# Patient Record
Sex: FEMALE | Race: OTHER | HISPANIC OR LATINO | URBAN - METROPOLITAN AREA
[De-identification: names, ages, dates, MRNs, and addresses within clinical notes are randomized per-mention and may not be internally consistent; named-entity substitution may affect disease eponyms.]

---

## 2022-05-08 ENCOUNTER — HOSPITAL ENCOUNTER (INPATIENT)
Facility: HOSPITAL | Age: 22
LOS: 10 days | Discharge: HOME | DRG: 885 | End: 2022-05-18
Attending: PSYCHIATRY & NEUROLOGY | Admitting: PSYCHIATRY & NEUROLOGY
Payer: MEDICAID

## 2022-05-08 DIAGNOSIS — F31.2 SEVERE MANIC BIPOLAR I DISORDER WITH PSYCHOTIC FEATURES: Primary | ICD-10-CM

## 2022-05-08 DIAGNOSIS — F29 PSYCHOSIS: ICD-10-CM

## 2022-05-08 PROCEDURE — 25000003 PHARM REV CODE 250: Performed by: PSYCHIATRY & NEUROLOGY

## 2022-05-08 PROCEDURE — 11400000 HC PSYCH PRIVATE ROOM

## 2022-05-08 PROCEDURE — 11000001 HC ACUTE MED/SURG PRIVATE ROOM

## 2022-05-08 RX ORDER — LORAZEPAM 2 MG/ML
2 INJECTION INTRAMUSCULAR EVERY 4 HOURS PRN
Status: DISCONTINUED | OUTPATIENT
Start: 2022-05-08 | End: 2022-05-18 | Stop reason: HOSPADM

## 2022-05-08 RX ORDER — ADHESIVE BANDAGE
30 BANDAGE TOPICAL DAILY PRN
Status: DISCONTINUED | OUTPATIENT
Start: 2022-05-08 | End: 2022-05-18 | Stop reason: HOSPADM

## 2022-05-08 RX ORDER — ACETAMINOPHEN 325 MG/1
650 TABLET ORAL EVERY 6 HOURS PRN
Status: DISCONTINUED | OUTPATIENT
Start: 2022-05-08 | End: 2022-05-18 | Stop reason: HOSPADM

## 2022-05-08 RX ORDER — NORETHINDRONE ACETATE AND ETHINYL ESTRADIOL, ETHINYL ESTRADIOL AND FERROUS FUMARATE 1MG-10(24)
1 KIT ORAL DAILY
COMMUNITY
Start: 2022-03-15

## 2022-05-08 RX ORDER — HYDROXYZINE HYDROCHLORIDE 50 MG/1
50 TABLET, FILM COATED ORAL EVERY 4 HOURS PRN
Status: DISCONTINUED | OUTPATIENT
Start: 2022-05-08 | End: 2022-05-18 | Stop reason: HOSPADM

## 2022-05-08 RX ORDER — DIPHENHYDRAMINE HCL 50 MG
50 CAPSULE ORAL EVERY 6 HOURS PRN
Status: DISCONTINUED | OUTPATIENT
Start: 2022-05-08 | End: 2022-05-18 | Stop reason: HOSPADM

## 2022-05-08 RX ORDER — HYDROXYZINE PAMOATE 25 MG/1
25 CAPSULE ORAL DAILY PRN
Status: ON HOLD | COMMUNITY
Start: 2022-04-18 | End: 2022-05-09

## 2022-05-08 RX ORDER — ISOTRETINOIN 20 MG/1
20 CAPSULE ORAL 2 TIMES DAILY
Status: ON HOLD | COMMUNITY
Start: 2022-04-14 | End: 2022-05-11

## 2022-05-08 RX ORDER — DIPHENHYDRAMINE HYDROCHLORIDE 50 MG/ML
50 INJECTION INTRAMUSCULAR; INTRAVENOUS EVERY 4 HOURS PRN
Status: DISCONTINUED | OUTPATIENT
Start: 2022-05-08 | End: 2022-05-18 | Stop reason: HOSPADM

## 2022-05-08 RX ORDER — PANTOPRAZOLE SODIUM 40 MG/1
40 TABLET, DELAYED RELEASE ORAL DAILY
COMMUNITY
Start: 2022-04-18

## 2022-05-08 RX ORDER — IBUPROFEN 200 MG
1 TABLET ORAL DAILY
Status: DISCONTINUED | OUTPATIENT
Start: 2022-05-09 | End: 2022-05-15

## 2022-05-08 RX ORDER — HALOPERIDOL 5 MG/ML
10 INJECTION INTRAMUSCULAR EVERY 4 HOURS PRN
Status: DISCONTINUED | OUTPATIENT
Start: 2022-05-08 | End: 2022-05-18 | Stop reason: HOSPADM

## 2022-05-08 RX ORDER — AZITHROMYCIN 250 MG/1
250 TABLET, FILM COATED ORAL
Status: ON HOLD | COMMUNITY
Start: 2022-05-05 | End: 2022-05-09

## 2022-05-08 RX ORDER — ALUMINUM HYDROXIDE, MAGNESIUM HYDROXIDE, AND SIMETHICONE 2400; 240; 2400 MG/30ML; MG/30ML; MG/30ML
30 SUSPENSION ORAL EVERY 6 HOURS PRN
Status: DISCONTINUED | OUTPATIENT
Start: 2022-05-08 | End: 2022-05-18 | Stop reason: HOSPADM

## 2022-05-08 RX ORDER — TRAZODONE HYDROCHLORIDE 100 MG/1
100 TABLET ORAL NIGHTLY PRN
Status: DISCONTINUED | OUTPATIENT
Start: 2022-05-08 | End: 2022-05-18 | Stop reason: HOSPADM

## 2022-05-08 RX ORDER — ISOTRETINOIN 30 MG/1
30 CAPSULE ORAL
Status: ON HOLD | COMMUNITY
End: 2022-05-09

## 2022-05-08 RX ORDER — SPIRONOLACTONE 25 MG/1
50 TABLET ORAL 2 TIMES DAILY
COMMUNITY
Start: 2022-03-13

## 2022-05-08 RX ADMIN — ACETAMINOPHEN 650 MG: 325 TABLET, FILM COATED ORAL at 10:05

## 2022-05-08 RX ADMIN — TRAZODONE HYDROCHLORIDE 100 MG: 100 TABLET ORAL at 10:05

## 2022-05-09 PROBLEM — F31.2 SEVERE MANIC BIPOLAR I DISORDER WITH PSYCHOTIC FEATURES: Status: ACTIVE | Noted: 2022-05-09

## 2022-05-09 LAB
ALBUMIN SERPL-MCNC: 4.4 GM/DL (ref 3.5–5)
ALBUMIN/GLOB SERPL: 1.4 RATIO (ref 1.1–2)
ALP SERPL-CCNC: 68 UNIT/L (ref 40–150)
ALT SERPL-CCNC: 22 UNIT/L (ref 0–55)
AST SERPL-CCNC: 22 UNIT/L (ref 5–34)
BASOPHILS # BLD AUTO: 0.02 X10(3)/MCL (ref 0–0.2)
BASOPHILS NFR BLD AUTO: 0.3 %
BILIRUBIN DIRECT+TOT PNL SERPL-MCNC: 0.3 MG/DL (ref 0–0.5)
BILIRUBIN DIRECT+TOT PNL SERPL-MCNC: 0.5 MG/DL (ref 0–0.8)
BILIRUBIN DIRECT+TOT PNL SERPL-MCNC: 0.8 MG/DL
BUN SERPL-MCNC: 10.7 MG/DL (ref 7–18.7)
CALCIUM SERPL-MCNC: 10.3 MG/DL (ref 8.4–10.2)
CHLORIDE SERPL-SCNC: 101 MMOL/L (ref 98–107)
CHOLEST SERPL-MCNC: 182 MG/DL
CHOLEST/HDLC SERPL: 3 {RATIO} (ref 0–5)
CO2 SERPL-SCNC: 27 MMOL/L (ref 22–29)
CREAT SERPL-MCNC: 0.74 MG/DL (ref 0.55–1.02)
EOSINOPHIL # BLD AUTO: 0.13 X10(3)/MCL (ref 0–0.9)
EOSINOPHIL NFR BLD AUTO: 1.9 %
ERYTHROCYTE [DISTWIDTH] IN BLOOD BY AUTOMATED COUNT: 11.9 % (ref 11.5–17)
GLOBULIN SER-MCNC: 3.1 GM/DL (ref 2.4–3.5)
GLUCOSE SERPL-MCNC: 88 MG/DL (ref 74–100)
HCT VFR BLD AUTO: 42.4 % (ref 37–47)
HDLC SERPL-MCNC: 66 MG/DL (ref 35–60)
HGB BLD-MCNC: 14.5 GM/DL (ref 12–16)
IMM GRANULOCYTES # BLD AUTO: 0.03 X10(3)/MCL (ref 0–0.02)
IMM GRANULOCYTES NFR BLD AUTO: 0.4 % (ref 0–0.43)
LDLC SERPL CALC-MCNC: 105 MG/DL (ref 50–140)
LYMPHOCYTES # BLD AUTO: 1.81 X10(3)/MCL (ref 0.6–4.6)
LYMPHOCYTES NFR BLD AUTO: 26.7 %
MCH RBC QN AUTO: 30.8 PG (ref 27–31)
MCHC RBC AUTO-ENTMCNC: 34.2 MG/DL (ref 33–36)
MCV RBC AUTO: 90 FL (ref 80–94)
MONOCYTES # BLD AUTO: 0.61 X10(3)/MCL (ref 0.1–1.3)
MONOCYTES NFR BLD AUTO: 9 %
NEUTROPHILS # BLD AUTO: 4.2 X10(3)/MCL (ref 2.1–9.2)
NEUTROPHILS NFR BLD AUTO: 61.7 %
NRBC BLD AUTO-RTO: 0 %
PLATELET # BLD AUTO: 304 X10(3)/MCL (ref 130–400)
PMV BLD AUTO: 9.7 FL (ref 9.4–12.4)
POTASSIUM SERPL-SCNC: 4.1 MMOL/L (ref 3.5–5.1)
PROT SERPL-MCNC: 7.5 GM/DL (ref 6.4–8.3)
RBC # BLD AUTO: 4.71 X10(6)/MCL (ref 4.2–5.4)
SODIUM SERPL-SCNC: 140 MMOL/L (ref 136–145)
T PALLIDUM AB SER QL: NONREACTIVE
T PALLIDUM AB SER QL: NORMAL
TRIGL SERPL-MCNC: 55 MG/DL (ref 37–140)
TSH SERPL-ACNC: 1.16 UIU/ML (ref 0.35–4.94)
VLDLC SERPL CALC-MCNC: 11 MG/DL
WBC # SPEC AUTO: 6.8 X10(3)/MCL (ref 4.5–11.5)

## 2022-05-09 PROCEDURE — 80061 LIPID PANEL: CPT | Performed by: PSYCHIATRY & NEUROLOGY

## 2022-05-09 PROCEDURE — 25000003 PHARM REV CODE 250: Performed by: PEDIATRICS

## 2022-05-09 PROCEDURE — 86780 TREPONEMA PALLIDUM: CPT | Performed by: PSYCHIATRY & NEUROLOGY

## 2022-05-09 PROCEDURE — 25000003 PHARM REV CODE 250: Performed by: PSYCHIATRY & NEUROLOGY

## 2022-05-09 PROCEDURE — 84443 ASSAY THYROID STIM HORMONE: CPT | Performed by: PSYCHIATRY & NEUROLOGY

## 2022-05-09 PROCEDURE — S4991 NICOTINE PATCH NONLEGEND: HCPCS | Performed by: PSYCHIATRY & NEUROLOGY

## 2022-05-09 PROCEDURE — 85025 COMPLETE CBC W/AUTO DIFF WBC: CPT | Performed by: PSYCHIATRY & NEUROLOGY

## 2022-05-09 PROCEDURE — 11400000 HC PSYCH PRIVATE ROOM

## 2022-05-09 PROCEDURE — 36415 COLL VENOUS BLD VENIPUNCTURE: CPT | Performed by: PSYCHIATRY & NEUROLOGY

## 2022-05-09 PROCEDURE — 11000001 HC ACUTE MED/SURG PRIVATE ROOM

## 2022-05-09 PROCEDURE — 80053 COMPREHEN METABOLIC PANEL: CPT | Performed by: PSYCHIATRY & NEUROLOGY

## 2022-05-09 RX ORDER — SPIRONOLACTONE 25 MG/1
50 TABLET ORAL 2 TIMES DAILY
Status: DISCONTINUED | OUTPATIENT
Start: 2022-05-09 | End: 2022-05-18 | Stop reason: HOSPADM

## 2022-05-09 RX ORDER — ARIPIPRAZOLE 5 MG/1
5 TABLET ORAL DAILY
Status: DISCONTINUED | OUTPATIENT
Start: 2022-05-09 | End: 2022-05-11

## 2022-05-09 RX ORDER — PANTOPRAZOLE SODIUM 40 MG/1
40 TABLET, DELAYED RELEASE ORAL DAILY
Status: DISCONTINUED | OUTPATIENT
Start: 2022-05-09 | End: 2022-05-18 | Stop reason: HOSPADM

## 2022-05-09 RX ADMIN — NICOTINE 1 PATCH: 21 PATCH, EXTENDED RELEASE TRANSDERMAL at 08:05

## 2022-05-09 RX ADMIN — HYDROXYZINE HYDROCHLORIDE 50 MG: 50 TABLET, FILM COATED ORAL at 04:05

## 2022-05-09 RX ADMIN — TRAZODONE HYDROCHLORIDE 100 MG: 100 TABLET ORAL at 08:05

## 2022-05-09 RX ADMIN — PANTOPRAZOLE SODIUM 40 MG: 40 TABLET, DELAYED RELEASE ORAL at 08:05

## 2022-05-09 RX ADMIN — ARIPIPRAZOLE 5 MG: 5 TABLET ORAL at 11:05

## 2022-05-09 RX ADMIN — SPIRONOLACTONE 50 MG: 25 TABLET ORAL at 08:05

## 2022-05-09 NOTE — PROGRESS NOTES
"UDS (-) ETOH  <10  Pt is 22yo hf admitted on PEC for AVH, bizarre behavior, and hyperreligiosity. Pt was found wandering in a field. "i stopped taking my birth control because it was baskically killing my babies. I know that God is doing something special. pt is single with no dependents and is currently living at home with her parents. 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 Pt states that she vapes pretty smells and has vaped synthetic marijuana so she could hear the voices of angels. Pt states that she is judged by her color, ethnicity and race and that she accidnetly committed suicide when she cuts herself shaving. "Sponge katherine is here to save us" During assessment, pt is obviously responding to internal stimuli. At one point she gets on her knees and begins to pray and talk to her shoes. pt will retrun home with her mother at time of discharge.      05/09/22 0900   Initial Information   Source of Information patient   Reason for Admission AVH, bizarre bx   Limitations on Visitors/Phone Calls none   Temporary Family Living Arrangements (While Hospitalized) none needed   Current or Previous  Service none   Legal Status    Type of Admission Involuntary   Type of Involuntary Admission PEC   Spiritual Beliefs   Spiritual, Cultural Beliefs, Advent Practices, Values that Affect Care yes   Description of Beliefs that Will Affect Care [facility]    Contact Status no, requests staff contact   Relationship/Environment   Primary Source of Support/Comfort parent   Name of Support/Comfort Primary Source Leah Cabrales   Lives With parent(s)   Primary Roles/Responsibilities student, full time   Resource/Environmental Concerns   Current Living Arrangements home/apartment/condo   Substance Use/Withdrawal   Substance Use Current, used prior to admission  (synthetic marijuana)   Abuse Screen (yes response referral indicated)   Feels Unsafe at Home or Work/School no   Feels Threatened by " Someone no   Does Anyone Try to Keep You From Having Contact with Others or Doing Things Outside Your Home? no   Physical Signs of Abuse Present no   Abuse Details   Physical Abuse No   Sexual Abuse No   Emotional Abuse No   If applicable, has the abuse been reported? No   If abuse has not been reported, is a report indicated? No   Suicidal Ideation   1. Wish to be Dead (Lifetime) No   1. Wish to be Dead (Past Month) No   1. Wish to be Dead (Now) No   2. Non-Specific Active Suicidal Thoughts (Lifetime) No   2. Non-Specific Active Suicidal Thoughts (Past Month) No   2. Non-Specific Active Suicidal Thoughts (Now) No   3. Active Suicidal Ideation with any Methods (Not Plan) Without Intent to Act (Lifetime) No   3. Active Sucidal Ideation with any Methods (Not Plan) Without Intent to Act (Past Month) No   3. Active Sucidal Ideation with any Methods (Not Plan) Without Intent to Act (Now) No   4. Active Suicidal Ideation with Some Intent to Act, Without Specific Plan (Lifetime) No   4. Active Suicidal Ideation with Some Intent to Act, Without Specific Plan (Past Month) No   4. Active Suicidal Ideation with Some Intent to Act, Without Specific Plan (Now) No   5. Active Suicidal Ideation with Specific Plan and Intent (Lifetime) No   5. Active Suicidal Ideation with Specific Plan and Intent (Past Month) No   5. Active Suicidal Ideation with Specific Plan and Intent (Now) No   Suicide Risk No Risk   Violence Risk   Feels Like Hurting Others no   Previous Attempt to Harm Others no   AUDIT-C (Alcohol Use Disorders ID Test)   Alcohol Use In Past Year 0-->never   Alcohol Amount Per Day In Past Year 0-->one or two   More Than 6 Drinks On One Occasion 0-->never   Total AUDIT-C Score 0   Elopement Screen   Have You Ever Attempted to Leave a Facility Without Permission or Authorization? no   Any History of Attempted Escape or Elopement During a Previous Hospitalization? no   Depression Screen (Over the Past Two Weeks)   Have You Felt  Down, Depressed or Hopeless? yes   Have You Felt Little Interest or Pleasure in Doing Things? yes   Traumatic Event Screening   Have You Ever Experienced a Traumatic Event? no   Have You Ever Witnessed a Violent Act? no   Have You Ever Experienced a Life Threatening Injury or Near Death Encounter? no   Transition Planning   Patient/Family Anticipates Transition to home;home with family   Transportation Anticipated family or friend will provide

## 2022-05-09 NOTE — H&P
[facility]  History & Physical    Subjective:      Chief Complaint/Reason for Admission: hallucinations     Gris Cabrales is a 21 y.o. female. She presents with hallucinations and psychosis. Was found wandering in a field     Past Medical History:   Diagnosis Date    Anxiety disorder, unspecified     Depression      No past surgical history on file.  No family history on file.       PTA Medications   Medication Sig    ISOtretinoin (ACCUTANE) 20 MG capsule Take 20 mg by mouth 2 (two) times daily.    LO LOESTRIN FE 1 mg-10 mcg (24)/10 mcg (2) Tab Take 1 tablet by mouth once daily.    pantoprazole (PROTONIX) 40 MG tablet Take 40 mg by mouth once daily.    spironolactone (ALDACTONE) 25 MG tablet Take 50 mg by mouth 2 (two) times daily.    azithromycin (Z-IFRAH) 250 MG tablet Take 250 mg by mouth.    hydrOXYzine pamoate (VISTARIL) 25 MG Cap Take 25 mg by mouth daily as needed.    ISOtretinoin (ACCUTANE) 30 MG capsule Take 30 mg by mouth.     Review of patient's allergies indicates:   Allergen Reactions    Penicillins Hives        Review of Systems   Constitutional: Negative.    HENT: Negative.    Eyes: Negative.    Respiratory: Negative.    Cardiovascular: Negative.    Gastrointestinal: Negative.    Genitourinary: Negative.    Musculoskeletal: Negative.    Skin: Negative.    Neurological: Negative.    Endo/Heme/Allergies: Negative.    Psychiatric/Behavioral: Positive for hallucinations and suicidal ideas. Negative for depression and substance abuse.       Objective:      Vital Signs (Most Recent)       Vital Signs Range (Last 24H):  Temp:  [98.1 °F (36.7 °C)-98.4 °F (36.9 °C)]   Pulse:  [78-93]   Resp:  [16-18]   BP: (130-133)/(74-86)   SpO2:  [98 %-99 %]     Physical Exam  HENT:      Head: Normocephalic.      Right Ear: Tympanic membrane normal.      Left Ear: Tympanic membrane normal.      Nose: Nose normal.      Mouth/Throat:      Mouth: Mucous membranes are moist.    Eyes:      Extraocular Movements: Extraocular movements intact.      Pupils: Pupils are equal, round, and reactive to light.   Cardiovascular:      Rate and Rhythm: Normal rate and regular rhythm.   Pulmonary:      Effort: Pulmonary effort is normal.   Abdominal:      General: Abdomen is flat.   Musculoskeletal:         General: Normal range of motion.   Skin:     General: Skin is warm.   Neurological:      General: No focal deficit present.      Mental Status: She is alert and oriented to person, place, and time.      Comments: Vision normal   Hearing normal   EOM intact   Face muscles normal  Facial sensation normal   Shrugs shoulders  Tongue midline            Data Review:    Recent Results (from the past 48 hour(s))   Urinalysis, Reflex to Urine Culture Urine, Clean Catch    Collection Time: 05/08/22 12:40 PM    Specimen: Urine   Result Value Ref Range    Color, UA Yellow Yellow, Colorless, Other, Clear    Appearance, UA Clear Clear    Specific Gravity, UA 1.006 1.001 - 1.030    pH, UA 7.0 5.0, 5.5, 6.0, 6.5, 7.0, 7.5, 8.0, 8.5    Protein, UA Negative Negative mg/dL    Glucose, UA Negative Negative, Normal mg/dL    Ketones, UA Negative Negative, +1, +2, +3, +4, +5 mg/dL    Blood, UA Negative Negative unit/L    Bilirubin, UA Negative Negative mg/dL    Urobilinogen, UA 0.2 0.2, 1.0, Normal mg/dL    Nitrites, UA Negative Negative    Leukocyte Esterase, UA Negative Negative, 75  unit/L   Pregnancy, urine rapid    Collection Time: 05/08/22 12:40 PM   Result Value Ref Range    Beta hCG Qualitative, Urine Negative Negative   Drug screen panel, emergency    Collection Time: 05/08/22 12:40 PM   Result Value Ref Range    Amphetamines, Urine Negative Negative    Barbituates, Urine Negative Negative    Benzodiazepine, Urine Negative Negative    Cannabinoids, Urine Negative Negative    Cocaine, Urine Negative Negative    Fentanyl, Urine Negative Negative    Mdma, Urine Negative Negative    Opiates, Urine Negative Negative     Phencyclidine, Urine Negative Negative    pH, Urine 7.0 3.0 - 11.0    Specific Gravity, Urine Auto 1.006 1.001 - 1.035   Urinalysis, Microscopic    Collection Time: 05/08/22 12:40 PM   Result Value Ref Range    RBC, UA None Seen None Seen, 0-2, 3-5, 0-5 /HPF    WBC, UA None Seen None Seen, 0-2, 3-5, 0-5 /HPF    Squamous Epithelial Cells, UA None Seen 0-4, None Seen /LPF    Bacteria, UA None Seen None Seen, Rare, Occasional /HPF   CBC with Differential    Collection Time: 05/08/22  1:31 PM   Result Value Ref Range    WBC 10.2 4.5 - 11.5 x10(3)/mcL    RBC 4.53 4.20 - 5.40 x10(6)/mcL    Hgb 14.0 12.0 - 16.0 gm/dL    Hct 39.9 37.0 - 47.0 %    MCV 88.1 80.0 - 94.0 fL    MCH 30.9 27.0 - 31.0 pg    MCHC 35.1 33.0 - 36.0 mg/dL    RDW 11.9 11.5 - 17.0 %    Platelet 291 130 - 400 x10(3)/mcL    MPV 9.4 9.4 - 12.4 fL    Neutro Auto 69.1 %    Lymph Auto 21.3 %    Mono Auto 8.3 %    Eos Auto 0.8 %    Basophil Auto 0.2 %    Abs Lymph 2.16 0.6 - 4.6 x10(3)/mcL    Abs Neutro 7.0 2.1 - 9.2 x10(3)/mcL    Abs Mono 0.84 0.1 - 1.3 x10(3)/mcL    Abs Eos 0.08 0 - 0.9 x10(3)/mcL    Abs Baso 0.02 0 - 0.2 x10(3)/mcL    IG# 0.03 (H) 0 - 0.0155 x10(3)/mcL    IG% 0.3 0 - 0.43 %    NRBC% 0.0 %   Comprehensive Metabolic Panel    Collection Time: 05/08/22  1:31 PM   Result Value Ref Range    Sodium Level 136 136 - 145 mmol/L    Potassium Level 3.9 3.5 - 5.1 mmol/L    Chloride 104 98 - 107 mmol/L    Carbon Dioxide 23 22 - 29 mmol/L    Glucose Level 98 74 - 100 mg/dL    Blood Urea Nitrogen 9.6 7.0 - 18.7 mg/dL    Creatinine 0.72 0.55 - 1.02 mg/dL    Calcium Level Total 9.8 8.4 - 10.2 mg/dL    Protein Total 7.4 6.4 - 8.3 gm/dL    Albumin Level 4.4 3.5 - 5.0 gm/dL    Globulin 3.0 2.4 - 3.5 gm/dL    Albumin/Globulin Ratio 1.5 1.1 - 2.0 ratio    Bilirubin Total 0.7 <=1.5 mg/dL    Bilirubin Direct 0.3 0.0 - 0.5 mg/dL    Bilirubin Indirect 0.40 0.00 - 0.80 mg/dL    Alkaline Phosphatase 66 40 - 150 unit/L    Alanine Aminotransferase 22 0 - 55 unit/L     Aspartate Aminotransferase 22 5 - 34 unit/L    Estimated GFR-Non  >60 mls/min/1.73/m2   Lipase    Collection Time: 05/08/22  1:31 PM   Result Value Ref Range    Lipase Level 21 <=60 U/L   Ethanol    Collection Time: 05/08/22  1:31 PM   Result Value Ref Range    Ethanol Level <10.0 <=10.0 mg/dL   TSH    Collection Time: 05/08/22  1:31 PM   Result Value Ref Range    Thyroid Stimulating Hormone 0.7846 0.3500 - 4.9400 uIU/mL        No results found.       Assessment and Plan       Psychosis/ schizophrenia with hallucinations

## 2022-05-09 NOTE — PLAN OF CARE
Problem: Adult Inpatient Plan of Care  Goal: Plan of Care Review  Outcome: Ongoing, Progressing  Goal: Patient-Specific Goal (Individualized)  Outcome: Ongoing, Progressing  Goal: Absence of Hospital-Acquired Illness or Injury  Outcome: Ongoing, Progressing  Goal: Optimal Comfort and Wellbeing  Outcome: Ongoing, Progressing  Goal: Readiness for Transition of Care  Outcome: Ongoing, Progressing     Problem: Behavior Regulation Impairment (Psychotic Signs/Symptoms)  Goal: Improved Behavioral Control (Psychotic Signs/Symptoms)  Outcome: Ongoing, Progressing     Problem: Cognitive Impairment (Psychotic Signs/Symptoms)  Goal: Optimal Cognitive Function (Psychotic Signs/Symptoms)  Outcome: Ongoing, Progressing     Problem: Psychomotor Impairment (Psychotic Signs/Symptoms)  Goal: Improved Psychomotor Symptoms (Psychotic Signs/Symptoms)  Outcome: Ongoing, Progressing     Problem: Sensory Perception Impairment (Psychotic Signs/Symptoms)  Goal: Decreased Sensory Symptoms (Psychotic Signs/Symptoms)  Outcome: Ongoing, Progressing     Problem: Sleep Disturbance (Psychotic Signs/Symptoms)  Goal: Improved Sleep (Psychotic Signs/Symptoms)  Outcome: Ongoing, Progressing

## 2022-05-09 NOTE — PROGRESS NOTES
05/09/22 1400   [facility] Group Therapy   Group Name Therapeutic Recreation   Specific Interventions Skilled Activity Creative Expression   Participation Level Active   Participation Quality Social   Insight/Motivation Limited   Affect/Mood Display Bizarre;Bright;Elevated;Impulsive   Cognition Disoriented;Confused   Psychomotor WNL   Gris attended TR group, was cooperative but bizarre, appearing manic with +RIS AEB hyper verbal with FOI & CARLEE.

## 2022-05-09 NOTE — H&P
"5/9/2022 9:11 AM   Gris Cabrales   2000   [id number]            Psychiatry Inpatient Admission Note    Date of Admission: 5/8/2022  6:42 PM    Current Legal Status: Physician's Emergency Certificate (PEC)    Chief Complaint: Anxiety, Depression, Confusion    SUBJECTIVE:   History of Present Illness:   Gris Cabrales is a 21 y.o. female placed under a PEC at [facility] due to anxiety, depression, and her mother's concerns about her confusion.  States, "I hate how people Spiritism you after but they don't realize the blessings they have.  God created music."  She states that she's "Worked so hard for everything that she has."  She states that she has not smoked cigarettes since 2017 but has a nicotine patch on.  When asked why they put it on her she states, "To make me laugh".  Difficult to get linear answers from her.  Very tangential.  Grandiose.  Odd.  (+)Auditory and visual hallucinations.  Will admit to [facility] for medication management and behavior monitoring.        Past Psychiatric History:   Previous Psychiatric Hospitalizations: Denies prior psychiatric treatment   Previous Medication Trials: Denies  Previous Suicide Attempts: States her mother "stresses her" sometimes and sometimes she wants to cut herself  Outpatient psychiatrist: None    Past Medical/Surgical History:   Past Medical History:   Diagnosis Date    Anxiety disorder, unspecified     Depression      No past surgical history on file.      Family Psychiatric History:   None known     Allergies:   Review of patient's allergies indicates:   Allergen Reactions    Penicillins Hives       Substance Abuse History:   Tobacco: Denies  Alcohol: Drinks Twisted Tea at times  Illicit Substances: States "all that" but UDS was negative  Treatment: N/a      Current Medications:   Home Psychiatric Meds: Ramírez    Scheduled Meds:    nicotine  1 patch Transdermal Daily    norethindrone-e.estradioL-iron  1 tablet Oral Daily    pantoprazole  40 mg Oral Daily    " spironolactone  50 mg Oral BID      PRN Meds: acetaminophen, aluminum & magnesium hydroxide-simethicone, diphenhydrAMINE, diphenhydrAMINE, haloperidol lactate, hydrOXYzine HCL, lorazepam, magnesium hydroxide 400 mg/5 ml, trazodone   Psychotherapeutics (From admission, onward)            Start     Stop Route Frequency Ordered    05/08/22 1937  haloperidol lactate injection 10 mg         -- IM Every 4 hours PRN 05/08/22 1842 05/08/22 1937  lorazepam injection 2 mg         -- IM Every 4 hours PRN 05/08/22 1842 05/08/22 1937  traZODone tablet 100 mg         -- Oral Nightly PRN 05/08/22 1842            Social History:  Housing Status: Lives in [place]  Relationship Status/Sexual Orientation: Single   Children: 0  Education: States that she is in [employer]   Employment Status/Info: States that she works at [employer]         OBJECTIVE:   Medical Review Of Systems:  Constitutional: negative  Respiratory: negative  Cardiovascular: negative  Gastrointestinal: negative  Genitourinary:negative  Musculoskeletal:negative  Neurological: negative    Vitals   There were no vitals filed for this visit.     Labs/Imaging/Studies:   Recent Results (from the past 48 hour(s))   Urinalysis, Reflex to Urine Culture Urine, Clean Catch    Collection Time: 05/08/22 12:40 PM    Specimen: Urine   Result Value Ref Range    Color, UA Yellow Yellow, Colorless, Other, Clear    Appearance, UA Clear Clear    Specific Gravity, UA 1.006 1.001 - 1.030    pH, UA 7.0 5.0, 5.5, 6.0, 6.5, 7.0, 7.5, 8.0, 8.5    Protein, UA Negative Negative mg/dL    Glucose, UA Negative Negative, Normal mg/dL    Ketones, UA Negative Negative, +1, +2, +3, +4, +5 mg/dL    Blood, UA Negative Negative unit/L    Bilirubin, UA Negative Negative mg/dL    Urobilinogen, UA 0.2 0.2, 1.0, Normal mg/dL    Nitrites, UA Negative Negative    Leukocyte Esterase, UA Negative Negative, 75  unit/L   Pregnancy, urine rapid    Collection Time: 05/08/22 12:40 PM   Result Value  Ref Range    Beta hCG Qualitative, Urine Negative Negative   Drug screen panel, emergency    Collection Time: 05/08/22 12:40 PM   Result Value Ref Range    Amphetamines, Urine Negative Negative    Barbituates, Urine Negative Negative    Benzodiazepine, Urine Negative Negative    Cannabinoids, Urine Negative Negative    Cocaine, Urine Negative Negative    Fentanyl, Urine Negative Negative    Mdma, Urine Negative Negative    Opiates, Urine Negative Negative    Phencyclidine, Urine Negative Negative    pH, Urine 7.0 3.0 - 11.0    Specific Gravity, Urine Auto 1.006 1.001 - 1.035   Urinalysis, Microscopic    Collection Time: 05/08/22 12:40 PM   Result Value Ref Range    RBC, UA None Seen None Seen, 0-2, 3-5, 0-5 /HPF    WBC, UA None Seen None Seen, 0-2, 3-5, 0-5 /HPF    Squamous Epithelial Cells, UA None Seen 0-4, None Seen /LPF    Bacteria, UA None Seen None Seen, Rare, Occasional /HPF   CBC with Differential    Collection Time: 05/08/22  1:31 PM   Result Value Ref Range    WBC 10.2 4.5 - 11.5 x10(3)/mcL    RBC 4.53 4.20 - 5.40 x10(6)/mcL    Hgb 14.0 12.0 - 16.0 gm/dL    Hct 39.9 37.0 - 47.0 %    MCV 88.1 80.0 - 94.0 fL    MCH 30.9 27.0 - 31.0 pg    MCHC 35.1 33.0 - 36.0 mg/dL    RDW 11.9 11.5 - 17.0 %    Platelet 291 130 - 400 x10(3)/mcL    MPV 9.4 9.4 - 12.4 fL    Neutro Auto 69.1 %    Lymph Auto 21.3 %    Mono Auto 8.3 %    Eos Auto 0.8 %    Basophil Auto 0.2 %    Abs Lymph 2.16 0.6 - 4.6 x10(3)/mcL    Abs Neutro 7.0 2.1 - 9.2 x10(3)/mcL    Abs Mono 0.84 0.1 - 1.3 x10(3)/mcL    Abs Eos 0.08 0 - 0.9 x10(3)/mcL    Abs Baso 0.02 0 - 0.2 x10(3)/mcL    IG# 0.03 (H) 0 - 0.0155 x10(3)/mcL    IG% 0.3 0 - 0.43 %    NRBC% 0.0 %   Comprehensive Metabolic Panel    Collection Time: 05/08/22  1:31 PM   Result Value Ref Range    Sodium Level 136 136 - 145 mmol/L    Potassium Level 3.9 3.5 - 5.1 mmol/L    Chloride 104 98 - 107 mmol/L    Carbon Dioxide 23 22 - 29 mmol/L    Glucose Level 98 74 - 100 mg/dL    Blood Urea Nitrogen 9.6  7.0 - 18.7 mg/dL    Creatinine 0.72 0.55 - 1.02 mg/dL    Calcium Level Total 9.8 8.4 - 10.2 mg/dL    Protein Total 7.4 6.4 - 8.3 gm/dL    Albumin Level 4.4 3.5 - 5.0 gm/dL    Globulin 3.0 2.4 - 3.5 gm/dL    Albumin/Globulin Ratio 1.5 1.1 - 2.0 ratio    Bilirubin Total 0.7 <=1.5 mg/dL    Bilirubin Direct 0.3 0.0 - 0.5 mg/dL    Bilirubin Indirect 0.40 0.00 - 0.80 mg/dL    Alkaline Phosphatase 66 40 - 150 unit/L    Alanine Aminotransferase 22 0 - 55 unit/L    Aspartate Aminotransferase 22 5 - 34 unit/L    Estimated GFR-Non  >60 mls/min/1.73/m2   Lipase    Collection Time: 05/08/22  1:31 PM   Result Value Ref Range    Lipase Level 21 <=60 U/L   Ethanol    Collection Time: 05/08/22  1:31 PM   Result Value Ref Range    Ethanol Level <10.0 <=10.0 mg/dL   TSH    Collection Time: 05/08/22  1:31 PM   Result Value Ref Range    Thyroid Stimulating Hormone 0.7846 0.3500 - 4.9400 uIU/mL   CBC with Differential    Collection Time: 05/09/22  7:40 AM   Result Value Ref Range    WBC 6.8 4.5 - 11.5 x10(3)/mcL    RBC 4.71 4.20 - 5.40 x10(6)/mcL    Hgb 14.5 12.0 - 16.0 gm/dL    Hct 42.4 37.0 - 47.0 %    MCV 90.0 80.0 - 94.0 fL    MCH 30.8 27.0 - 31.0 pg    MCHC 34.2 33.0 - 36.0 mg/dL    RDW 11.9 11.5 - 17.0 %    Platelet 304 130 - 400 x10(3)/mcL    MPV 9.7 9.4 - 12.4 fL    Neutro Auto 61.7 %    Lymph Auto 26.7 %    Mono Auto 9.0 %    Eos Auto 1.9 %    Basophil Auto 0.3 %    Abs Lymph 1.81 0.6 - 4.6 x10(3)/mcL    Abs Neutro 4.2 2.1 - 9.2 x10(3)/mcL    Abs Mono 0.61 0.1 - 1.3 x10(3)/mcL    Abs Eos 0.13 0 - 0.9 x10(3)/mcL    Abs Baso 0.02 0 - 0.2 x10(3)/mcL    IG# 0.03 (H) 0 - 0.0155 x10(3)/mcL    IG% 0.4 0 - 0.43 %    NRBC% 0.0 %   Lipid panel    Collection Time: 05/09/22  7:40 AM   Result Value Ref Range    Cholesterol Total 182 <=200 mg/dL    HDL Cholesterol 66 (H) 35 - 60 mg/dL    Triglyceride 55 37 - 140 mg/dL    Cholesterol/HDL Ratio 3 0 - 5    Very Low Density Lipoprotein 11     LDL Cholesterol 105.00 50.00 - 140.00  mg/dL   Comprehensive metabolic panel    Collection Time: 05/09/22  7:40 AM   Result Value Ref Range    Sodium Level 140 136 - 145 mmol/L    Potassium Level 4.1 3.5 - 5.1 mmol/L    Chloride 101 98 - 107 mmol/L    Carbon Dioxide 27 22 - 29 mmol/L    Glucose Level 88 74 - 100 mg/dL    Blood Urea Nitrogen 10.7 7.0 - 18.7 mg/dL    Creatinine 0.74 0.55 - 1.02 mg/dL    Calcium Level Total 10.3 (H) 8.4 - 10.2 mg/dL    Protein Total 7.5 6.4 - 8.3 gm/dL    Albumin Level 4.4 3.5 - 5.0 gm/dL    Globulin 3.1 2.4 - 3.5 gm/dL    Albumin/Globulin Ratio 1.4 1.1 - 2.0 ratio    Bilirubin Total 0.8 <=1.5 mg/dL    Bilirubin Direct 0.3 0.0 - 0.5 mg/dL    Bilirubin Indirect 0.50 0.00 - 0.80 mg/dL    Alkaline Phosphatase 68 40 - 150 unit/L    Alanine Aminotransferase 22 0 - 55 unit/L    Aspartate Aminotransferase 22 5 - 34 unit/L    Estimated GFR-Non  >60 mls/min/1.73/m2   TSH    Collection Time: 05/09/22  7:40 AM   Result Value Ref Range    Thyroid Stimulating Hormone 1.1610 0.3500 - 4.9400 uIU/mL      No results found for: PHENYTOIN, PHENOBARB, VALPROATE, CBMZ        Psychiatric Mental Status Exam:  General Appearance: appears stated age, well-developed, well-nourished  Arousal: alert  Behavior: Odd  Movements and Motor Activity: no abnormal involuntary movements noted  Orientation: oriented to person and time  Speech: rambles  Mood: Euphoric  Affect: constricted  Thought Process: tangential  Associations: loose associations at times  Thought Content and Perceptions: (+)auditory hallucinations of several voices, potential visual hallucinations.  Denies suicidal or homicidal ideations.  Recent and Remote Memory: mild impairments noted  Attention and Concentration: impaired  Fund of Knowledge: vocabulary appropriate  Insight: impaired  Judgment: impaired      Patient Strengths:  Access to care and Able to verbalize needs      Patient Liabilities:  Psychosis      Discharge Criteria:  Improved mood, Improved thought process  and Medication compliance    ASSESSMENT/PLAN:   Diagnosis:  Bipolar Disorder, most recent episode manic, with psychotic features (F31.2)  R/o Schizoaffective Disorder, Schizophrenia    Past Medical History:   Diagnosis Date    Anxiety disorder, unspecified     Depression           Plan:  -Admit to [facility]  -Abilify 5mg daily  -Will attempt to obtain outside psychiatric records if available  -SW to assist with aftercare planning and collateral  -Once stable discharge home with outpatient follow up care and/or rehab  -Continue inpatient treatment under a PEC and/or CEC for danger to self/ danger to others/grave disability as evidenced by significant psychotic thought disorder      Estimated length of stay: 5-7 days    Estimated Disposition: Home    Estimated Follow-up: Outpatient medication management      On this date, I have reviewed the medical history and Nursing Assessment, as well as records from referral source.  I have evaluated the mental status of the above named person and concur with the findings of all assessments.  I have provided medical direction for the development of the Treatment Plan.    I conclude that this patient meets admission criteria for inpatient treatment.  I certify that this patient poses a danger to self or others, or would otherwise be considered gravely disabled based on this assessment and/or provided collateral information.     I have provided medical direction for the development of the Treatment plan.  These services will be provided while this patient is under my care and will be based on an individualized plan of care.  The patient can demonstrate a reasonable expectation of improvement in his/her disorder as a result of the active treatment being provided.      Cristhian White M.D.

## 2022-05-09 NOTE — CARE UPDATE
CW spoke with pt mother, she states that pt has been going days without sleep. She stated that her bizarre behavior began about 3 weeks ago. She starts telling stories but never finishes and starts telling another story. She states that the doctor gave her something for her anxiety. She's not sleeping. She went shopping this weekend and spend more than $2000 which is unlike her, she's big on saving money for school. Hydroxyzine 25mg was what was prescribed, but she was not taking it.

## 2022-05-09 NOTE — PROGRESS NOTES
"   05/09/22 1000   Rec Therapy Time Calculation   Rec Start Time 1000   Rec Stop Time 1045   Rec Total Time (min) 45 min   Attendance   Activity Recreational Therapy   Participation Passive participation   Gris passively attended TR groups, was bizarre with BOO BEJARANO, appearing delusional reporting "I'm pregnant", minimal interact with peers and staff, as she is working towards her TR goals  "

## 2022-05-09 NOTE — GROUP NOTE
Group Psychotherapy       Group Focus: The Ungame     Number of patients in attendance: 7    Therapist explored patients need to improve thought processes and identify negative thinking patterns. Patient was able to discuss ways to learn new methods to improve ways of thinking going forward. Patient continues to make progress towards treatment goals.     Group Start Time: 1045  Group End Time:  1130  Groups Date: 5/9/2022  Group Topic:  Behavioral Health  Group Department: [facility] - Behavioral Health Unit  Group Facilitators:  Jenifer Kilpatrick  _____________________________________________________________________    Patient Name: Gris Cabrales  MRN: [id number]  Patient Class: IP- Psych   Admission Date\Time: 5/8/2022  6:42 PM  Hospital Length of Stay: 1  Primary Care Provider: Ernie Santiago MD     Referred by: Behavioral Medicine Unit Treatment Team     Target symptoms: Psychosis     Patient's response to treatment: Frequent Questions     Progress toward goals: Minimal progress     Interval History:        Diagnosis:      Plan: Continue treatment

## 2022-05-10 PROCEDURE — 11400000 HC PSYCH PRIVATE ROOM

## 2022-05-10 PROCEDURE — 11000001 HC ACUTE MED/SURG PRIVATE ROOM

## 2022-05-10 PROCEDURE — 25000003 PHARM REV CODE 250: Performed by: PSYCHIATRY & NEUROLOGY

## 2022-05-10 PROCEDURE — 25000003 PHARM REV CODE 250: Performed by: PEDIATRICS

## 2022-05-10 RX ADMIN — TRAZODONE HYDROCHLORIDE 100 MG: 100 TABLET ORAL at 08:05

## 2022-05-10 RX ADMIN — SPIRONOLACTONE 50 MG: 25 TABLET ORAL at 08:05

## 2022-05-10 RX ADMIN — ARIPIPRAZOLE 5 MG: 5 TABLET ORAL at 09:05

## 2022-05-10 RX ADMIN — SPIRONOLACTONE 50 MG: 25 TABLET ORAL at 09:05

## 2022-05-10 RX ADMIN — PANTOPRAZOLE SODIUM 40 MG: 40 TABLET, DELAYED RELEASE ORAL at 09:05

## 2022-05-10 NOTE — PROGRESS NOTES
05/10/22 0832   General   Admit Date 05/08/22   Primary Diagnosis bipolar d/o   Latter day yes Confucianism   Number of Children 0   Occupation subtutude teacher   Does the patient have dentures? No   If you were to take part in activities, which of the following would you prefer? Both   Do you feel like you have enough to keep you busy now? Yes   Do you believe that you have the opportunity for physical activity? Yes   Activity Capabilities Minimum   Subjective   Patient states to feel safe   Assessment   Mobility ambulates independently   Transfers independently   Visual Acuity normal vision   Visual Perception depth perception;color perception;recognizes letters;recognizes numbers   Hearing normal   Speech/Communication normal   Cognitive Concerns confusion;problem solving;disoriented to;time   Emotional Concerns withholds emotional response;appears depressed;somatic   Leisure Interest Survey   Leisure Interest Survey Yes   Social/Group Activities   Temple/Alevism Would like to learn/do   Team Sports Would like to learn/do   Parties/Seasonal Program Would like to learn/do   Shopping Would like to learn/do   Volunteering Would like to learn/do   Clubs/Organization Would like to learn/do   Group Discuss Would like to learn/do   Current Events Would like to learn/do   Restaurant Would like to learn/do   Solitary Activities   Watching TV Would like to learn/do   Computer Activities Would like to learn/do   Word Search Puzzles Would like to learn/do   Cross Word Puzzles Would like to learn/do   Jigsaw Puzzles Would like to learn/do   Watching Videos Would like to learn/do   Music Listening Would like to learn/do   Listenting to books on tape Would like to learn/do   Reading Would like to learn/do   Fritter Card Games Would like to learn/do   Physical Activities   Dancing Would like to learn/do   Archery Would like to learn/do   Baseball/Softball Would like to learn/do   Track/Field Would like to learn/do    Billiards/Pool Would like to learn/do   Tennis/Badminton Would like to learn/do   Swimming Would like to learn/do   Bowling Would like to learn/do   Volleyball Would like to learn/do   Fitness/Exercise Programs Would like to learn/do   Golf/Miniature Golf Would like to learn/do   Basketball Would like to learn/do   Weightlifting Would like to learn/do   Walk/Run Would like to learn/do   Creative Activities   Drawing Would like to learn/do   Painting Would like to learn/do   Wood Working Would like to learn/do   Sewing Would like to learn/do   Pottery Would like to learn/do   Creative Writing Would like to learn/do   Playing Musical Instru Would like to learn/do   Photography Would like to learn/do   Needlework Would like to learn/do   Singing Would like to learn/do   Cooking Would like to learn/do   Drama Would like to learn/do   Outdoor Activities   Hunting Would like to learn/do   Picnics/Cookouts Would like to learn/do   Bicycling Would like to learn/do   Fishing Would like to learn/do   Gardening Would like to learn/do   Camping Would like to learn/do   Sledding/Tobogganing Would like to learn/do   Skiing Would like to learn/do   Water Sports Would like to learn/do   Horseback Riding Would like to learn/do   Hiking Would like to learn/do   Spectator Events   Concerts Would like to learn/do   Plays Would like to learn/do   Movies Would like to learn/do   Sporting Events Would like to learn/do   Passive Games   Trivia Games Would like to learn/do   Educational Games Would like to learn/do   Social Board Games Would like to learn/do   Classic Board Games Would like to learn/do   Bingo Would like to learn/do   Card Games Would like to learn/do   Therapeutic Recreation   Community Reintegration Needs increased awareness of accessibility issues   Stress Management/Relaxation Training Unable to control emotions, anxiety, or pain   Social Skills Interacts with others with cues   Leisure Education Lacks understanding  of value of leisure involvement in recreation   Leisure Resources Lacks awareness of leisure resources   Leisure Attitude/Participation With coaxing, participates in group activity and identifies benefits of involvement   Rec Therapy Group Assistance verbal cues;tactile cues;visual cues   Problem Solving Activity Assistance increased time;verbal cues   Plan   Planned Therapy Intervention Group Recreational Therapy   Expected Length of Stay 5-7   PT Frequency Minimum of 3 visits per week   Gris is a 21 female admitted for neil & psychosis. Pt presents confused, disorientated to date & situation requiring prompting due to speaking in a childish whisper as Pt reports ability to perform her ADL's but, unable to cook for herself, and is interested in MH services.

## 2022-05-10 NOTE — PROGRESS NOTES
05/10/22 1100   [facility] Group Therapy   Group Name Therapeutic Recreation   Specific Interventions Skilled Activity Mild Exercises   Participation Level None   Participation Quality Sleeping   Insight/Motivation None   Affect/Mood Display Bizarre;Impulsive;Other (see comments)  (somatic with FOI & CARLEE)   Cognition Confused;Thought Blocking;Disoriented   Psychomotor WNL   Gris passively attended TR group, was somatic and bizarre with +RIS, FOI & CARLEE, as Pt is passively working towards TR goals.

## 2022-05-10 NOTE — PLAN OF CARE
Problem: Adult Inpatient Plan of Care  Goal: Plan of Care Review  Outcome: Ongoing, Progressing  Goal: Patient-Specific Goal (Individualized)  Outcome: Ongoing, Progressing  Goal: Readiness for Transition of Care  Outcome: Ongoing, Progressing

## 2022-05-10 NOTE — PROGRESS NOTES
"5/10/2022 12:31 PM   Gris Cabrales   2000   [id number]        Psychiatry Progress Note     SUBJECTIVE:   Gris Cabrales is a 21 y.o. female placed under a PEC at [facility] due to anxiety, depression, and her mother's concerns about her confusion. Staff reports that the pt is still bizarre and tangential.  staff reports that during the evaluation, she got out of the chair and stood to pray then started talking to her shoes. Patient states, "[place] is saving us, Adalid [place] is our leaders and protectors. I just needed a healing place, that's why I'm here". Difficult to get linear answers from her. Very tangential. Odd. (+)Auditory and visual hallucinations. Pt denies SI and HI.      Current Medications:   Scheduled Meds:    ARIPiprazole  5 mg Oral Daily    nicotine  1 patch Transdermal Daily    norethindrone-e.estradioL-iron  1 tablet Oral Daily    pantoprazole  40 mg Oral Daily    spironolactone  50 mg Oral BID      PRN Meds: acetaminophen, aluminum & magnesium hydroxide-simethicone, diphenhydrAMINE, diphenhydrAMINE, haloperidol lactate, hydrOXYzine HCL, lorazepam, magnesium hydroxide 400 mg/5 ml, trazodone   Psychotherapeutics (From admission, onward)            Start     Stop Route Frequency Ordered    05/09/22 1045  ARIPiprazole tablet 5 mg         -- Oral Daily 05/09/22 0936    05/08/22 1937  haloperidol lactate injection 10 mg         -- IM Every 4 hours PRN 05/08/22 1842 05/08/22 1937  lorazepam injection 2 mg         -- IM Every 4 hours PRN 05/08/22 1842    05/08/22 1937  traZODone tablet 100 mg         -- Oral Nightly PRN 05/08/22 1842          Allergies:   Review of patient's allergies indicates:   Allergen Reactions    Penicillins Hives        OBJECTIVE:   Vitals   Vitals:    05/09/22 2300   BP: 115/80   Pulse: 92   Resp: 20   Temp: 98.1 °F (36.7 °C)        Labs/Imaging/Studies:   Recent Results (from the past 36 hour(s))   SYPHILIS ANTIBODY (WITH REFLEX RPR)    Collection Time: 05/09/22  7:40 " AM   Result Value Ref Range    Syphilis Antibody Nonreactive Nonreactive, Equivocal    Syphilis Antibody #     CBC with Differential    Collection Time: 05/09/22  7:40 AM   Result Value Ref Range    WBC 6.8 4.5 - 11.5 x10(3)/mcL    RBC 4.71 4.20 - 5.40 x10(6)/mcL    Hgb 14.5 12.0 - 16.0 gm/dL    Hct 42.4 37.0 - 47.0 %    MCV 90.0 80.0 - 94.0 fL    MCH 30.8 27.0 - 31.0 pg    MCHC 34.2 33.0 - 36.0 mg/dL    RDW 11.9 11.5 - 17.0 %    Platelet 304 130 - 400 x10(3)/mcL    MPV 9.7 9.4 - 12.4 fL    Neut % 61.7 %    Lymph % 26.7 %    Mono Auto 9.0 %    Eos Auto 1.9 %    Basophil Auto 0.3 %    Lymph # 1.81 0.6 - 4.6 x10(3)/mcL    Neut # 4.2 2.1 - 9.2 x10(3)/mcL    Abs Mono 0.61 0.1 - 1.3 x10(3)/mcL    Abs Eos 0.13 0 - 0.9 x10(3)/mcL    Abs Baso 0.02 0 - 0.2 x10(3)/mcL    IG# 0.03 (H) 0 - 0.0155 x10(3)/mcL    IG% 0.4 0 - 0.43 %    NRBC% 0.0 %   Lipid panel    Collection Time: 05/09/22  7:40 AM   Result Value Ref Range    Cholesterol Total 182 <=200 mg/dL    HDL Cholesterol 66 (H) 35 - 60 mg/dL    Triglyceride 55 37 - 140 mg/dL    Cholesterol/HDL Ratio 3 0 - 5    Very Low Density Lipoprotein 11     LDL Cholesterol 105.00 50.00 - 140.00 mg/dL   Comprehensive metabolic panel    Collection Time: 05/09/22  7:40 AM   Result Value Ref Range    Sodium Level 140 136 - 145 mmol/L    Potassium Level 4.1 3.5 - 5.1 mmol/L    Chloride 101 98 - 107 mmol/L    Carbon Dioxide 27 22 - 29 mmol/L    Glucose Level 88 74 - 100 mg/dL    Blood Urea Nitrogen 10.7 7.0 - 18.7 mg/dL    Creatinine 0.74 0.55 - 1.02 mg/dL    Calcium Level Total 10.3 (H) 8.4 - 10.2 mg/dL    Protein Total 7.5 6.4 - 8.3 gm/dL    Albumin Level 4.4 3.5 - 5.0 gm/dL    Globulin 3.1 2.4 - 3.5 gm/dL    Albumin/Globulin Ratio 1.4 1.1 - 2.0 ratio    Bilirubin Total 0.8 <=1.5 mg/dL    Bilirubin Direct 0.3 0.0 - 0.5 mg/dL    Bilirubin Indirect 0.50 0.00 - 0.80 mg/dL    Alkaline Phosphatase 68 40 - 150 unit/L    Alanine Aminotransferase 22 0 - 55 unit/L    Aspartate Aminotransferase 22 5  - 34 unit/L    Estimated GFR-Non  >60 mls/min/1.73/m2   TSH    Collection Time: 05/09/22  7:40 AM   Result Value Ref Range    Thyroid Stimulating Hormone 1.1610 0.3500 - 4.9400 uIU/mL        Psychiatric Mental Status Exam:  General Appearance: appears stated age, well-developed, well-nourished  Arousal: alert  Behavior: Odd  Movements and Motor Activity: no abnormal involuntary movements noted  Orientation: oriented to person, place, and time  Speech: Rambles  Mood: Euphoric  Affect: constricted  Thought Process: tangential  Associations: +loosening of associations  Thought Content and Perceptions: + auditory hallucinations, potential visual hallucinations  Recent and Remote Memory: mild impairments noted  Attention and Concentration: impaired  Fund of Knowledge: vocabulary appropriate  Insight: impaired  Judgment: impaired    ASSESSMENT/PLAN:   Diagnosis:  Bipolar Disorder, most recent episode manic, with psychotic features (F31.2)  R/o Schizoaffective Disorder, Schizophrenia, Substance Induced Mood Disorder    Past Medical History:   Diagnosis Date    Anxiety disorder, unspecified     Depression        Plan:  - Continue Abilify 5mg PO QD - consider increasing dose vs Zyprexa  - Follow-up with aftercare planning.    Tereso Mckeon, KEVIN-BC  5/10/2022

## 2022-05-11 PROCEDURE — 25000003 PHARM REV CODE 250: Performed by: PSYCHIATRY & NEUROLOGY

## 2022-05-11 PROCEDURE — 11000001 HC ACUTE MED/SURG PRIVATE ROOM

## 2022-05-11 PROCEDURE — 25000003 PHARM REV CODE 250: Performed by: PEDIATRICS

## 2022-05-11 PROCEDURE — 63600175 PHARM REV CODE 636 W HCPCS: Performed by: PSYCHIATRY & NEUROLOGY

## 2022-05-11 PROCEDURE — 11400000 HC PSYCH PRIVATE ROOM

## 2022-05-11 PROCEDURE — S4991 NICOTINE PATCH NONLEGEND: HCPCS | Performed by: PSYCHIATRY & NEUROLOGY

## 2022-05-11 RX ORDER — ARIPIPRAZOLE 5 MG/1
10 TABLET ORAL DAILY
Status: DISCONTINUED | OUTPATIENT
Start: 2022-05-12 | End: 2022-05-12

## 2022-05-11 RX ADMIN — ARIPIPRAZOLE 5 MG: 5 TABLET ORAL at 09:05

## 2022-05-11 RX ADMIN — PANTOPRAZOLE SODIUM 40 MG: 40 TABLET, DELAYED RELEASE ORAL at 09:05

## 2022-05-11 RX ADMIN — NICOTINE 1 PATCH: 21 PATCH, EXTENDED RELEASE TRANSDERMAL at 09:05

## 2022-05-11 RX ADMIN — ALUMINUM HYDROXIDE, MAGNESIUM HYDROXIDE, AND DIMETHICONE 30 ML: 400; 400; 40 SUSPENSION ORAL at 01:05

## 2022-05-11 RX ADMIN — TRAZODONE HYDROCHLORIDE 100 MG: 100 TABLET ORAL at 08:05

## 2022-05-11 RX ADMIN — SPIRONOLACTONE 50 MG: 25 TABLET ORAL at 09:05

## 2022-05-11 RX ADMIN — SPIRONOLACTONE 50 MG: 25 TABLET ORAL at 08:05

## 2022-05-11 NOTE — GROUP NOTE
Group Psychotherapy     Therapy ball - ball     Group topic: Therapy Ball: Therapist explored patients need for identifying personal strengths and qualities in working towards mental health goals by asking questions from our therapy ball. Patient was able to discuss increasing emotional and mental wellness through positive thinking and strength recognition. Patient continues to make progress towards treatment goals.   Group Focus: Psychodynamic Group Psychotherapy      Number of patients in attendance: 5    Group Start Time: 1045  Group End Time:  1130  Groups Date: 5/10/2022  Group Topic:  Behavioral Health  Group Department: [facility] Behavioral [facility]  Group Facilitators:  Leonid Lopez LMSW  _____________________________________________________________________    Patient Name: Gris Cabrales  MRN: [id number]  Patient Class: IP- Psych   Admission Date\Time: 5/8/2022  6:42 PM  Hospital Length of Stay: 3  Primary Care Provider: Ernie Santiago MD     Referred by: Acute Psychiatry Unit Treatment Team     Target symptoms: Anxiety, Psychosis and Confusion     Patient's response to treatment: Active Listening and Self-disclosure     Progress toward goals: Progressing adequately     Interval History:      Diagnosis:      Plan: Continue treatment on APU

## 2022-05-11 NOTE — GROUP NOTE
Group Psychotherapy       Group Focus: Anxiety      Number of patients in attendance: 6    Group Topic: Stress Management/Crisis Plan. Therapist assisted with understanding of treatment plan, identification of responsibilities for actions, assisted patients in identifying sources of support and developing awareness of crisis symptoms. Patient was able to examine benefits and consequences and make recommendations on how to handle crisis in the future. Pt continues to make progress towards goals.?      Group Start Time: 1030  Group End Time:  1115  Groups Date: 5/11/2022  Group Topic:  Behavioral Health  Group Department: [facility] Behavioral Health Unit  Group Facilitators:  Jenifer Kilpatrick  _____________________________________________________________________    Patient Name: Gris Cabrales  MRN: [id number]  Patient Class: IP- Psych   Admission Date\Time: 5/8/2022  6:42 PM  Hospital Length of Stay: 3  Primary Care Provider: Ernie Santiago MD     Referred by: Behavioral Medicine Unit Treatment Team     Target symptoms: Psychosis     Patient's response to treatment: withdrawn     Progress toward goals: Minimal progress     Interval History:      Diagnosis:      Plan: Continue treatment on BMU

## 2022-05-11 NOTE — PLAN OF CARE
Problem: Adult Inpatient Plan of Care  Goal: Plan of Care Review  Outcome: Ongoing, Not Progressing  Goal: Patient-Specific Goal (Individualized)  Outcome: Ongoing, Not Progressing  Goal: Readiness for Transition of Care  Outcome: Ongoing, Not Progressing   Gris passively attends TR groups, is somatic, appearing psychotic AEB bizarre, tangential, and childlike with FOI, CARLEE & +RIS, speaking in a childlike whisper at times then in normal tone & volume, interacts with peers & staff, and attends her ADL's with minimal prompting.

## 2022-05-11 NOTE — PROGRESS NOTES
"5/11/2022 11:06 AM   Gris Cabrales   2000   [id number]        Psychiatry Progress Note     SUBJECTIVE:   Gris Cabrales is a 21 y.o. female placed under a PEC at [facility] due to anxiety, depression, and her mother's concerns about her confusion.  Staff reports that she remains odd.  She apparently told staff that she had previously vaped "perfume" but then states that this was years ago.  Disorganized, tangential, confused.  She has not been participating well in treatment.  Answers are somewhat more linear (still become tangential) but remains odd.  Still constricted.  Eyes closed during much of my evaluation.  Responding to internal stimuli per staff.  Her mother states that she has never been like this before.     Current Medications:   Scheduled Meds:    ARIPiprazole  5 mg Oral Daily    nicotine  1 patch Transdermal Daily    norethindrone-e.estradioL-iron  1 tablet Oral Daily    pantoprazole  40 mg Oral Daily    spironolactone  50 mg Oral BID      PRN Meds: acetaminophen, aluminum & magnesium hydroxide-simethicone, diphenhydrAMINE, diphenhydrAMINE, haloperidol lactate, hydrOXYzine HCL, lorazepam, magnesium hydroxide 400 mg/5 ml, trazodone   Psychotherapeutics (From admission, onward)            Start     Stop Route Frequency Ordered    05/09/22 1045  ARIPiprazole tablet 5 mg         -- Oral Daily 05/09/22 0936    05/08/22 1937  haloperidol lactate injection 10 mg         -- IM Every 4 hours PRN 05/08/22 1842 05/08/22 1937  lorazepam injection 2 mg         -- IM Every 4 hours PRN 05/08/22 1842    05/08/22 1937  traZODone tablet 100 mg         -- Oral Nightly PRN 05/08/22 1842          Allergies:   Review of patient's allergies indicates:   Allergen Reactions    Penicillins Hives        OBJECTIVE:   Vitals   Vitals:    05/11/22 0903   BP: 126/75   Pulse:    Resp:    Temp:         Labs/Imaging/Studies:   No results found for this or any previous visit (from the past 36 hour(s)).     Psychiatric Mental " "Status Exam:  General Appearance: appears stated age, well-developed, well-nourished  Arousal: alert  Behavior: Odd  Movements and Motor Activity: no abnormal involuntary movements noted  Orientation: oriented to person, place, and time  Speech: Rambles, soft  Mood: "I do have mood swings"  Affect: constricted  Thought Process: tangential  Associations: +loosening of associations  Thought Content and Perceptions: + auditory hallucinations, potential visual hallucinations.  Staff reports responding to internal stimuli  Recent and Remote Memory: mild impairments noted  Attention and Concentration: impaired  Fund of Knowledge: vocabulary appropriate  Insight: impaired  Judgment: impaired    ASSESSMENT/PLAN:   Diagnosis:  Bipolar Disorder, most recent episode manic, with psychotic features (F31.2)  R/o Schizoaffective Disorder, Schizophrenia    Past Medical History:   Diagnosis Date    Anxiety disorder, unspecified     Depression         Plan:  -Increase Abilify to 10mg daily  -F/u with aftercare planning    Expected Disposition Plan: Home    I conclude that this patient continues to meet criteria for continued inpatient treatment.  I certify that this patient poses a danger to self or others, or would otherwise be considered gravely disabled based on this assessment and/or provided collateral information.  Symptoms include significant psychotic thought disorder and hallucinations.      Cristhian White M.D.    "

## 2022-05-11 NOTE — PLAN OF CARE
Problem: Adult Inpatient Plan of Care  Goal: Plan of Care Review  Outcome: Ongoing, Not Progressing  Goal: Patient-Specific Goal (Individualized)  Outcome: Ongoing, Not Progressing  Goal: Readiness for Transition of Care  Outcome: Ongoing, Not Progressing

## 2022-05-11 NOTE — PROGRESS NOTES
05/11/22 1400   [facility] Group Therapy   Group Name Therapeutic Recreation   Specific Interventions Skilled Activity Leisure Education and Awareness   Participation Level Minimal   Participation Quality Withdrawn   Insight/Motivation None   Affect/Mood Display Bizarre;Blunted;Flat;Impulsive   Cognition Confused;FOI;CARLEE;Pre-Occupied;RIS   Psychomotor WNL   Gris attended 105 min TR group with minimal participation, was confused, bizarre & pre occupied with a blunted affect, minimal interact with peers as she's working towards her TR goals

## 2022-05-12 PROCEDURE — 11400000 HC PSYCH PRIVATE ROOM

## 2022-05-12 PROCEDURE — 25000003 PHARM REV CODE 250: Performed by: PEDIATRICS

## 2022-05-12 PROCEDURE — S4991 NICOTINE PATCH NONLEGEND: HCPCS | Performed by: PSYCHIATRY & NEUROLOGY

## 2022-05-12 PROCEDURE — 25000003 PHARM REV CODE 250: Performed by: PSYCHIATRY & NEUROLOGY

## 2022-05-12 PROCEDURE — 11000001 HC ACUTE MED/SURG PRIVATE ROOM

## 2022-05-12 RX ORDER — ARIPIPRAZOLE 5 MG/1
15 TABLET ORAL DAILY
Status: DISCONTINUED | OUTPATIENT
Start: 2022-05-13 | End: 2022-05-13

## 2022-05-12 RX ADMIN — SPIRONOLACTONE 50 MG: 25 TABLET ORAL at 08:05

## 2022-05-12 RX ADMIN — NICOTINE 1 PATCH: 21 PATCH, EXTENDED RELEASE TRANSDERMAL at 09:05

## 2022-05-12 RX ADMIN — HYDROXYZINE HYDROCHLORIDE 50 MG: 50 TABLET, FILM COATED ORAL at 07:05

## 2022-05-12 RX ADMIN — PANTOPRAZOLE SODIUM 40 MG: 40 TABLET, DELAYED RELEASE ORAL at 09:05

## 2022-05-12 RX ADMIN — ALUMINUM HYDROXIDE, MAGNESIUM HYDROXIDE, AND DIMETHICONE 30 ML: 400; 400; 40 SUSPENSION ORAL at 09:05

## 2022-05-12 RX ADMIN — TRAZODONE HYDROCHLORIDE 100 MG: 100 TABLET ORAL at 08:05

## 2022-05-12 RX ADMIN — SPIRONOLACTONE 50 MG: 25 TABLET ORAL at 09:05

## 2022-05-12 RX ADMIN — ARIPIPRAZOLE 10 MG: 5 TABLET ORAL at 09:05

## 2022-05-12 NOTE — PROGRESS NOTES
5/12/2022 11:06 AM   Gris Cabrales   2000   [id number]        Psychiatry Progress Note     SUBJECTIVE:   Gris Cabrales is a 21 y.o. female placed under a PEC at [facility] due to anxiety, depression, and her mother's concerns about her confusion.  Still odd.  Eyes still frequently closed while speaking to us.  She reports that she is still having auditory hallucinations.  Will titrate Abilify but will potentially change to Risperdal if these do not improve.  She is also stating that she believes that she can read people's minds.     Current Medications:   Scheduled Meds:    ARIPiprazole  10 mg Oral Daily    nicotine  1 patch Transdermal Daily    norethindrone-e.estradioL-iron  1 tablet Oral Daily    pantoprazole  40 mg Oral Daily    spironolactone  50 mg Oral BID      PRN Meds: acetaminophen, aluminum & magnesium hydroxide-simethicone, diphenhydrAMINE, diphenhydrAMINE, haloperidol lactate, hydrOXYzine HCL, lorazepam, magnesium hydroxide 400 mg/5 ml, trazodone   Psychotherapeutics (From admission, onward)            Start     Stop Route Frequency Ordered    05/12/22 0900  ARIPiprazole tablet 10 mg         -- Oral Daily 05/11/22 1117    05/08/22 1937  haloperidol lactate injection 10 mg         -- IM Every 4 hours PRN 05/08/22 1842 05/08/22 1937  lorazepam injection 2 mg         -- IM Every 4 hours PRN 05/08/22 1842 05/08/22 1937  traZODone tablet 100 mg         -- Oral Nightly PRN 05/08/22 1842          Allergies:   Review of patient's allergies indicates:   Allergen Reactions    Penicillins Hives        OBJECTIVE:   Vitals   Vitals:    05/12/22 0906   BP: 122/84   Pulse:    Resp:    Temp:         Labs/Imaging/Studies:   No results found for this or any previous visit (from the past 36 hour(s)).     Psychiatric Mental Status Exam:  General Appearance: appears stated age, well-developed, well-nourished  Arousal: alert  Behavior: Odd  Movements and Motor Activity: no abnormal involuntary movements  "noted  Orientation: oriented to person, place, and time  Speech: Rambles, soft  Mood: "I do have mood swings"  Affect: constricted  Thought Process: tangential  Associations: +loosening of associations  Thought Content and Perceptions: + auditory hallucinations, potential visual hallucinations.  Staff reports responding to internal stimuli.  Grandiose delusions  Recent and Remote Memory: mild impairments noted  Attention and Concentration: impaired  Fund of Knowledge: vocabulary appropriate  Insight: impaired  Judgment: impaired    ASSESSMENT/PLAN:   Diagnosis:  Bipolar Disorder, most recent episode manic, with psychotic features (F31.2)  R/o Schizoaffective Disorder, Schizophrenia    Past Medical History:   Diagnosis Date    Anxiety disorder, unspecified     Depression         Plan:  -Increase Abilify to 15mg daily  -F/u with aftercare planning    Expected Disposition Plan: Home    I conclude that this patient continues to meet criteria for continued inpatient treatment.  I certify that this patient poses a danger to self or others, or would otherwise be considered gravely disabled based on this assessment and/or provided collateral information.  Symptoms include significant psychotic thought disorder and hallucinations.      Cristhian White M.D.  "

## 2022-05-12 NOTE — GROUP NOTE
Group Psychotherapy       Group Focus: Psychodynamic Group Psychotherapy and Strength Training      Number of patients in attendance: 6    Group topic: ?Strengths/ Positive Thinking. ?Therapist explored patients need for identifying personal strengths and qualities in working towards mental health goals.??Patient was able to discuss increasing emotional and mental wellness through positive thinking and strength recognition. ?Patient continues to make progress towards treatment goals.      Group Start Time: 1045  Group End Time:  1115  Groups Date: 5/12/2022  Group Topic:  Behavioral Health  Group Department: [facility]  Group Facilitators:  Leonid Lopez LMSW  _____________________________________________________________________    Patient Name: Gris Cabrales  MRN: [id number]  Patient Class: IP- Psych   Admission Date\Time: 5/8/2022  6:42 PM  Hospital Length of Stay: 4  Primary Care Provider: Ernie Santiago MD     Referred by: Behavioral Medicine Unit Treatment Team     Target symptoms: Psychosis and Confusion     Patient's response to treatment: Active Listening     Progress toward goals: Progressing adequately     Interval History: 0     Diagnosis: 0     Plan: Continue treatment on BMU  Pt was in and out of group. She participated at first and then spent some time at nursing station.

## 2022-05-12 NOTE — PROGRESS NOTES
05/12/22 1000   [facility] Group Therapy   Group Name Therapeutic Recreation   Specific Interventions Skilled Activity Mild Exercises   Participation Level Minimal   Participation Quality Drowsy;Cooperative   Insight/Motivation Limited   Affect/Mood Display Blunted;Flat   Cognition Pre-Occupied;Thought Blocking   Psychomotor WNL   Gris attended with minimal participated in TR group, was cooperative & drowsy with a blunted affect, minimally interacting with peers & staff as Pt is working towards TR goals

## 2022-05-12 NOTE — PROGRESS NOTES
05/12/22 1400   [facility] Group Therapy   Group Name Therapeutic Recreation   Specific Interventions Skilled Activity Stress Management   Participation Level Active;Sharing   Participation Quality Cooperative;Requires Prompting;Needs Redirection   Insight/Motivation Limited   Affect/Mood Display Blunted   Cognition Confused;RIS;Pre-Occupied;Thought Blocking   Psychomotor WNL   Gris attended TR group, was blunted & confused but cooperative, unable to follow 2 step direction despite prompting x4 for each step in activity, confused, and interacted with peers as Pt is working towards TR goals.

## 2022-05-13 PROCEDURE — 11000001 HC ACUTE MED/SURG PRIVATE ROOM

## 2022-05-13 PROCEDURE — 25000003 PHARM REV CODE 250: Performed by: NURSE PRACTITIONER

## 2022-05-13 PROCEDURE — 25000003 PHARM REV CODE 250: Performed by: PSYCHIATRY & NEUROLOGY

## 2022-05-13 PROCEDURE — 25000003 PHARM REV CODE 250: Performed by: PEDIATRICS

## 2022-05-13 PROCEDURE — 11400000 HC PSYCH PRIVATE ROOM

## 2022-05-13 RX ORDER — RISPERIDONE 1 MG/1
1 TABLET ORAL NIGHTLY
Status: DISCONTINUED | OUTPATIENT
Start: 2022-05-13 | End: 2022-05-18 | Stop reason: HOSPADM

## 2022-05-13 RX ORDER — RISPERIDONE 0.25 MG/1
0.5 TABLET ORAL DAILY
Status: DISCONTINUED | OUTPATIENT
Start: 2022-05-14 | End: 2022-05-18 | Stop reason: HOSPADM

## 2022-05-13 RX ADMIN — ARIPIPRAZOLE 15 MG: 5 TABLET ORAL at 09:05

## 2022-05-13 RX ADMIN — HYDROXYZINE HYDROCHLORIDE 50 MG: 50 TABLET, FILM COATED ORAL at 08:05

## 2022-05-13 RX ADMIN — SPIRONOLACTONE 50 MG: 25 TABLET ORAL at 09:05

## 2022-05-13 RX ADMIN — RISPERIDONE 1 MG: 1 TABLET ORAL at 08:05

## 2022-05-13 RX ADMIN — SPIRONOLACTONE 50 MG: 25 TABLET ORAL at 08:05

## 2022-05-13 RX ADMIN — PANTOPRAZOLE SODIUM 40 MG: 40 TABLET, DELAYED RELEASE ORAL at 09:05

## 2022-05-13 RX ADMIN — TRAZODONE HYDROCHLORIDE 100 MG: 100 TABLET ORAL at 08:05

## 2022-05-13 NOTE — PROGRESS NOTES
"[facility] Behavioral Health Unit  Adult Nutrition  Progress Note    SUMMARY       Recommendations  1. Continue current diet as tolerated         2. Consider bowel regimen for possible constipation       3. Will continue to monitor po intake, wt, labs      Goals: Meet greater than 75% of nutritional needs  Nutrition Goal Status: new        Reason for Assessment  Reason For Assessment: length of stay    Diagnosis: Bipolar Disorder, most recent episode manic, with psychotic features    R/o Schizoaffective Disorder, Schizophrenia    Relevant Medical History: Anxiety disorder, unspecified and Depression        Assessment and Plan    Pt reports good po intake, consuming 100% of most meals. Pt reports some N/V/C, no bowel meds noted. Pt also reports feeling dizzy. Pt noted with decreased appetite pta. Pt reports decreased appetite on & off over the past 3 years, contributes to altered taste. Pt states she would eat, but was "not enjoying the food." Pt reports food tasting better since admit. Pt reports no recent wt loss. UBW ~120#.        Nutrition Risk Screen  Nutrition Risk Screen: no indicators present      Nutrition/Diet History  Spiritual, Cultural Beliefs, Alevism Practices, Values that Affect Care: yes      Anthropometrics  Temp: 97.5 °F (36.4 °C)  Height Method: Estimated  Height: 5' 2.99" (160 cm)  Height (inches): 62.99 in  Weight Method: Estimated  Weight: 54.4 kg (119 lb 14.9 oz)  Weight (lb): 119.93 lb  Ideal Body Weight (IBW), Female: 114.95 lb  % Ideal Body Weight, Female (lb): 104.33 %  BMI (Calculated): 21.3  Usual Body Weight (UBW), k.5 kg  % Usual Body Weight: 100.03  % Weight Change From Usual Weight: -0.18 %         Lab/Procedures/Meds  Labs: No recent nutrition related labs noted    Meds: protonix, spironolactone, mylanta        Nutrition Prescription Ordered  Current Diet Order: Regular  Tolerance: tolerating      Evaluation of Received Nutrient/Fluid Intake  % Intake of " Estimated Energy Needs: 75 - 100 %  % Meal Intake: 75 - 100 %      Nutrition Risk  Level of Risk/Frequency of Follow-up: low       Monitor and Evaluation  Food and Nutrient Intake: energy intake  Anthropometric Measurements: weight  Biochemical Data, Medical Tests and Procedures: electrolyte and renal panel, glucose/endocrine profile       Nutrition Follow-Up  RD Follow-up?: Yes         Bridget Wilkes, PRINCESS  05/13/2022

## 2022-05-13 NOTE — PROGRESS NOTES
05/13/22 1000   [facility] Group Therapy   Group Name Therapeutic Recreation   Specific Interventions Skilled Activity Mild Exercises   Participation Level Active   Participation Quality Cooperative   Insight/Motivation Limited   Affect/Mood Display Blunted;Impulsive   Cognition Pre-Occupied;RIS;Thought Blocking   Psychomotor WNL   Gris attended TR group, was cooperative but distracted with a blunted affect and pleasant, interacting minimally with peers & staff as Pt is working towards TR goals.

## 2022-05-13 NOTE — PROGRESS NOTES
5/13/2022 12:03 PM   Gris Cabrales   2000   [id number]        Psychiatry Progress Note     SUBJECTIVE:   Gris Cabrales is a 21 y.o. female placed under a PEC at [facility] due to anxiety, depression, and her mother's concerns about her confusion. Still odd. Eyes still frequently closed while speaking to us. She continues to report AH. Sill with a constricted mood.Pt denies SI and HI. Will discontinue Abilify and start Risperdal.        Current Medications:   Scheduled Meds:    ARIPiprazole  15 mg Oral Daily    nicotine  1 patch Transdermal Daily    norethindrone-e.estradioL-iron  1 tablet Oral Daily    pantoprazole  40 mg Oral Daily    spironolactone  50 mg Oral BID      PRN Meds: acetaminophen, aluminum & magnesium hydroxide-simethicone, diphenhydrAMINE, diphenhydrAMINE, haloperidol lactate, hydrOXYzine HCL, lorazepam, magnesium hydroxide 400 mg/5 ml, trazodone   Psychotherapeutics (From admission, onward)            Start     Stop Route Frequency Ordered    05/13/22 0900  ARIPiprazole tablet 15 mg         -- Oral Daily 05/12/22 1218    05/08/22 1937  haloperidol lactate injection 10 mg         -- IM Every 4 hours PRN 05/08/22 1842    05/08/22 1937  lorazepam injection 2 mg         -- IM Every 4 hours PRN 05/08/22 1842    05/08/22 1937  traZODone tablet 100 mg         -- Oral Nightly PRN 05/08/22 1842          Allergies:   Review of patient's allergies indicates:   Allergen Reactions    Penicillins Hives        OBJECTIVE:   Vitals   Vitals:    05/13/22 0914   BP: 109/67   Pulse: (!) 121   Resp: 16   Temp: 97.5 °F (36.4 °C)        Labs/Imaging/Studies:   No results found for this or any previous visit (from the past 36 hour(s)).     Psychiatric Mental Status Exam:  General Appearance: appears stated age, well-developed, well-nourished  Arousal: alert  Behavior: Odd  Movements and Motor Activity: no abnormal involuntary movements noted  Orientation: oriented to person, place, and time  Speech: Rambles,  "soft  Mood: "I don't know"  Affect: constricted  Thought Process: tangential  Associations: +loosening of associations  Thought Content and Perceptions: + auditory hallucinations, potential visual hallucinations.  Staff reports responding to internal stimuli.  Grandiose delusions  Recent and Remote Memory: mild impairments noted  Attention and Concentration: impaired  Fund of Knowledge: vocabulary appropriate  Insight: impaired  Judgment: impaired    ASSESSMENT/PLAN:   Diagnosis:  Bipolar Disorder, most recent episode manic, with psychotic features (F31.2)  R/o Schizoaffective Disorder, Schizophrenia    Past Medical History:   Diagnosis Date    Anxiety disorder, unspecified     Depression         Plan:  - Discontinue Abilify.  - Start Risperdal 0.5mg PO QD and 1mg PO QHS.  - Follow-up with aftercare planning    KEVIN Sampson-BC  5/13/2022  "

## 2022-05-14 PROCEDURE — 11400000 HC PSYCH PRIVATE ROOM

## 2022-05-14 PROCEDURE — 25000003 PHARM REV CODE 250: Performed by: NURSE PRACTITIONER

## 2022-05-14 PROCEDURE — 25000003 PHARM REV CODE 250: Performed by: PSYCHIATRY & NEUROLOGY

## 2022-05-14 PROCEDURE — 25000003 PHARM REV CODE 250: Performed by: PEDIATRICS

## 2022-05-14 PROCEDURE — 11000001 HC ACUTE MED/SURG PRIVATE ROOM

## 2022-05-14 RX ADMIN — PANTOPRAZOLE SODIUM 40 MG: 40 TABLET, DELAYED RELEASE ORAL at 08:05

## 2022-05-14 RX ADMIN — RISPERIDONE 0.5 MG: 0.25 TABLET ORAL at 08:05

## 2022-05-14 RX ADMIN — RISPERIDONE 1 MG: 1 TABLET ORAL at 08:05

## 2022-05-14 RX ADMIN — HYDROXYZINE HYDROCHLORIDE 50 MG: 50 TABLET, FILM COATED ORAL at 11:05

## 2022-05-14 RX ADMIN — SPIRONOLACTONE 50 MG: 25 TABLET ORAL at 09:05

## 2022-05-14 RX ADMIN — ALUMINUM HYDROXIDE, MAGNESIUM HYDROXIDE, AND DIMETHICONE 30 ML: 400; 400; 40 SUSPENSION ORAL at 11:05

## 2022-05-14 RX ADMIN — TRAZODONE HYDROCHLORIDE 100 MG: 100 TABLET ORAL at 08:05

## 2022-05-14 RX ADMIN — SPIRONOLACTONE 50 MG: 25 TABLET ORAL at 08:05

## 2022-05-15 PROCEDURE — 25000003 PHARM REV CODE 250: Performed by: PEDIATRICS

## 2022-05-15 PROCEDURE — 25000003 PHARM REV CODE 250: Performed by: PSYCHIATRY & NEUROLOGY

## 2022-05-15 PROCEDURE — 11400000 HC PSYCH PRIVATE ROOM

## 2022-05-15 PROCEDURE — 11000001 HC ACUTE MED/SURG PRIVATE ROOM

## 2022-05-15 PROCEDURE — 25000003 PHARM REV CODE 250: Performed by: NURSE PRACTITIONER

## 2022-05-15 RX ADMIN — PANTOPRAZOLE SODIUM 40 MG: 40 TABLET, DELAYED RELEASE ORAL at 09:05

## 2022-05-15 RX ADMIN — SPIRONOLACTONE 50 MG: 25 TABLET ORAL at 09:05

## 2022-05-15 RX ADMIN — RISPERIDONE 0.5 MG: 0.25 TABLET ORAL at 09:05

## 2022-05-15 RX ADMIN — HYDROXYZINE HYDROCHLORIDE 50 MG: 50 TABLET, FILM COATED ORAL at 08:05

## 2022-05-15 RX ADMIN — TRAZODONE HYDROCHLORIDE 100 MG: 100 TABLET ORAL at 08:05

## 2022-05-15 RX ADMIN — RISPERIDONE 1 MG: 1 TABLET ORAL at 08:05

## 2022-05-15 NOTE — PLAN OF CARE
Problem: Behavior Regulation Impairment (Psychotic Signs/Symptoms)  Goal: Improved Behavioral Control (Psychotic Signs/Symptoms)  Outcome: Ongoing, Progressing

## 2022-05-16 PROCEDURE — 25000003 PHARM REV CODE 250: Performed by: PEDIATRICS

## 2022-05-16 PROCEDURE — 11000001 HC ACUTE MED/SURG PRIVATE ROOM

## 2022-05-16 PROCEDURE — 25000003 PHARM REV CODE 250: Performed by: NURSE PRACTITIONER

## 2022-05-16 PROCEDURE — 11400000 HC PSYCH PRIVATE ROOM

## 2022-05-16 PROCEDURE — 25000003 PHARM REV CODE 250: Performed by: PSYCHIATRY & NEUROLOGY

## 2022-05-16 RX ADMIN — PANTOPRAZOLE SODIUM 40 MG: 40 TABLET, DELAYED RELEASE ORAL at 09:05

## 2022-05-16 RX ADMIN — RISPERIDONE 1 MG: 1 TABLET ORAL at 08:05

## 2022-05-16 RX ADMIN — SPIRONOLACTONE 50 MG: 25 TABLET ORAL at 09:05

## 2022-05-16 RX ADMIN — TRAZODONE HYDROCHLORIDE 100 MG: 100 TABLET ORAL at 08:05

## 2022-05-16 RX ADMIN — SPIRONOLACTONE 50 MG: 25 TABLET ORAL at 08:05

## 2022-05-16 RX ADMIN — RISPERIDONE 0.5 MG: 0.25 TABLET ORAL at 09:05

## 2022-05-16 NOTE — PROGRESS NOTES
05/16/22 1000   [facility] Group Therapy   Group Name Therapeutic Recreation   Specific Interventions Skilled Activity Mild Exercises   Participation Level Active;Supportive;Appropriate;Attentive;Sharing   Participation Quality Cooperative;Social   Insight/Motivation Improved   Affect/Mood Display Appropriate   Cognition Oriented   Psychomotor WNL   Gris fully participated in TR group, was pleasant & cooperative and interacting well with peer & staff as she works towards TR goals

## 2022-05-16 NOTE — PROGRESS NOTES
5/16/2022 11:06 AM   Gris Cabrales   2000   [id number]        Psychiatry Progress Note     SUBJECTIVE:   Gris Cabrales is a 21 y.o. female placed under a PEC at [facility] due to anxiety, depression, and her mother's concerns about her confusion.  Staff reports some improvement over the weekend.  She states that she has been doing better and that the medication has been helping.  When asked how the medication is helping, she states that the spironolactone is helping her face.  She is no longer reading minds but does believe that she was able to do so recently.  Will titrate Risperdal today and will monitor progress.       Current Medications:   Scheduled Meds:    norethindrone-e.estradioL-iron  1 tablet Oral Daily    pantoprazole  40 mg Oral Daily    risperiDONE  0.5 mg Oral Daily    risperiDONE  1 mg Oral QHS    spironolactone  50 mg Oral BID      PRN Meds: acetaminophen, aluminum & magnesium hydroxide-simethicone, diphenhydrAMINE, diphenhydrAMINE, haloperidol lactate, hydrOXYzine HCL, lorazepam, magnesium hydroxide 400 mg/5 ml, trazodone   Psychotherapeutics (From admission, onward)            Start     Stop Route Frequency Ordered    05/14/22 0900  risperiDONE tablet 0.5 mg         -- Oral Daily 05/13/22 1220    05/13/22 2100  risperiDONE tablet 1 mg         -- Oral Nightly 05/13/22 1220    05/08/22 1937  haloperidol lactate injection 10 mg         -- IM Every 4 hours PRN 05/08/22 1842 05/08/22 1937  lorazepam injection 2 mg         -- IM Every 4 hours PRN 05/08/22 1842 05/08/22 1937  traZODone tablet 100 mg         -- Oral Nightly PRN 05/08/22 1842          Allergies:   Review of patient's allergies indicates:   Allergen Reactions    Penicillins Hives        OBJECTIVE:   Vitals   Vitals:    05/15/22 1900   BP: 123/79   Pulse: 99   Resp: 16   Temp: 98.6 °F (37 °C)        Labs/Imaging/Studies:   No results found for this or any previous visit (from the past 36 hour(s)).     Psychiatric Mental Status  Exam:  General Appearance: appears stated age, well-developed, well-nourished  Arousal: alert  Behavior: Odd  Movements and Motor Activity: no abnormal involuntary movements noted  Orientation: oriented to person, place, and time  Speech: Rambles, soft  Mood: Improving  Affect: constricted  Thought Process: Some improvement  Associations: improving  Thought Content and Perceptions: + auditory hallucinations (improving), potential visual hallucinations.  Staff reports responding to internal stimuli.  Grandiose delusions  Recent and Remote Memory: mild impairments noted  Attention and Concentration: impaired  Fund of Knowledge: vocabulary appropriate  Insight: impaired  Judgment: impaired    ASSESSMENT/PLAN:   Diagnosis:  Bipolar Disorder, most recent episode manic, with psychotic features (F31.2)  R/o Schizoaffective Disorder, Schizophrenia    Past Medical History:   Diagnosis Date    Anxiety disorder, unspecified     Depression         Plan:  -Increase Risperdal to 1mg BID    Expected Disposition Plan: Home    I conclude that this patient continues to meet criteria for continued inpatient treatment.  I certify that this patient poses a danger to self or others, or would otherwise be considered gravely disabled based on this assessment and/or provided collateral information.  Symptoms include significant psychotic thought disorder and hallucinations.      Cristhian White M.D.

## 2022-05-16 NOTE — PLAN OF CARE
Problem: Adult Inpatient Plan of Care  Goal: Plan of Care Review  Outcome: Ongoing, Progressing  Goal: Patient-Specific Goal (Individualized)  Outcome: Ongoing, Progressing  Goal: Readiness for Transition of Care  Outcome: Ongoing, Progressing     Problem: Behavior Regulation Impairment (Psychotic Signs/Symptoms)  Goal: Improved Behavioral Control (Psychotic Signs/Symptoms)  Outcome: Ongoing, Progressing     Problem: Cognitive Impairment (Psychotic Signs/Symptoms)  Goal: Optimal Cognitive Function (Psychotic Signs/Symptoms)  Outcome: Ongoing, Progressing     Problem: Mood Impairment (Psychotic Signs/Symptoms)  Goal: Improved Mood Symptoms (Psychotic Signs/Symptoms)  Outcome: Ongoing, Progressing     Problem: Sleep Disturbance (Psychotic Signs/Symptoms)  Goal: Improved Sleep (Psychotic Signs/Symptoms)  Outcome: Ongoing, Progressing

## 2022-05-17 PROCEDURE — 25000003 PHARM REV CODE 250: Performed by: PEDIATRICS

## 2022-05-17 PROCEDURE — 25000003 PHARM REV CODE 250: Performed by: NURSE PRACTITIONER

## 2022-05-17 PROCEDURE — 11000001 HC ACUTE MED/SURG PRIVATE ROOM

## 2022-05-17 PROCEDURE — 11400000 HC PSYCH PRIVATE ROOM

## 2022-05-17 PROCEDURE — 25000003 PHARM REV CODE 250: Performed by: PSYCHIATRY & NEUROLOGY

## 2022-05-17 RX ADMIN — PANTOPRAZOLE SODIUM 40 MG: 40 TABLET, DELAYED RELEASE ORAL at 08:05

## 2022-05-17 RX ADMIN — TRAZODONE HYDROCHLORIDE 100 MG: 100 TABLET ORAL at 08:05

## 2022-05-17 RX ADMIN — RISPERIDONE 0.5 MG: 0.25 TABLET ORAL at 08:05

## 2022-05-17 RX ADMIN — SPIRONOLACTONE 50 MG: 25 TABLET ORAL at 08:05

## 2022-05-17 RX ADMIN — RISPERIDONE 1 MG: 1 TABLET ORAL at 08:05

## 2022-05-17 NOTE — PROGRESS NOTES
"5/17/2022 12:23 PM   Gris Cabrales   2000   [id number]        Psychiatry Progress Note     SUBJECTIVE:   Gris Cabrales is a 21 y.o. female placed under a PEC at [facility] due to anxiety, depression, and her mother's concerns about her confusion. Pt reports that her mood today is "I'm good, happy". Pt notes the benefits to the medications and denies side effects to the current medication regimen; no evidence of EPS, TD, or dystonia. Pt reports that her sleep is "better" and her appetite is increased. Pt denies SI and HI. Pt denies VH, however, she does report AH - "Its all good voices". Pt reports that she is no longer reading minds. Plan is discharge home tomorrow and follow-up with [facility].      Current Medications:   Scheduled Meds:    norethindrone-e.estradioL-iron  1 tablet Oral Daily    pantoprazole  40 mg Oral Daily    risperiDONE  0.5 mg Oral Daily    risperiDONE  1 mg Oral QHS    spironolactone  50 mg Oral BID      PRN Meds: acetaminophen, aluminum & magnesium hydroxide-simethicone, diphenhydrAMINE, diphenhydrAMINE, haloperidol lactate, hydrOXYzine HCL, lorazepam, magnesium hydroxide 400 mg/5 ml, trazodone   Psychotherapeutics (From admission, onward)            Start     Stop Route Frequency Ordered    05/14/22 0900  risperiDONE tablet 0.5 mg         -- Oral Daily 05/13/22 1220    05/13/22 2100  risperiDONE tablet 1 mg         -- Oral Nightly 05/13/22 1220    05/08/22 1937  haloperidol lactate injection 10 mg         -- IM Every 4 hours PRN 05/08/22 1842 05/08/22 1937  lorazepam injection 2 mg         -- IM Every 4 hours PRN 05/08/22 1842 05/08/22 1937  traZODone tablet 100 mg         -- Oral Nightly PRN 05/08/22 1842          Allergies:   Review of patient's allergies indicates:   Allergen Reactions    Penicillins Hives        OBJECTIVE:   Vitals   Vitals:    05/17/22 0857   BP: 111/74   Pulse:    Resp:    Temp:         Labs/Imaging/Studies:   No results found for this or any " previous visit (from the past 36 hour(s)).     Psychiatric Mental Status Exam:  General Appearance: appears stated age, well-developed, well-nourished  Arousal: alert  Behavior: Odd  Movements and Motor Activity: no abnormal involuntary movements noted  Orientation: oriented to person, place, and time  Speech: Soft  Mood: Improving  Affect: constricted  Thought Process: Some improvement  Associations: improving  Thought Content and Perceptions: + auditory hallucinations (improving), no visual hallucinations.  Recent and Remote Memory: mild impairments noted  Attention and Concentration: impaired  Fund of Knowledge: vocabulary appropriate  Insight: impaired  Judgment: impaired    ASSESSMENT/PLAN:   Diagnosis:  Bipolar Disorder, most recent episode manic, with psychotic features (F31.2)  R/o Schizoaffective Disorder, Schizophrenia    Past Medical History:   Diagnosis Date    Anxiety disorder, unspecified     Depression        Plan:  -Continue current POC  -Discharge home tomorrow  -Follow-up with [facility]     Expected Disposition Plan: Home     I conclude that this patient continues to meet criteria for continued inpatient treatment.  I certify that this patient poses a danger to self or others, or would otherwise be considered gravely disabled based on this assessment and/or provided collateral information.  Symptoms include significant psychotic thought disorder and hallucinations.    Tereso Mckeon, PMRAULP-BC  5/17/2022

## 2022-05-18 VITALS
OXYGEN SATURATION: 98 % | HEIGHT: 63 IN | TEMPERATURE: 98 F | SYSTOLIC BLOOD PRESSURE: 106 MMHG | HEART RATE: 74 BPM | WEIGHT: 113.75 LBS | BODY MASS INDEX: 20.16 KG/M2 | RESPIRATION RATE: 17 BRPM | DIASTOLIC BLOOD PRESSURE: 69 MMHG

## 2022-05-18 PROCEDURE — 25000003 PHARM REV CODE 250: Performed by: NURSE PRACTITIONER

## 2022-05-18 PROCEDURE — 25000003 PHARM REV CODE 250: Performed by: PEDIATRICS

## 2022-05-18 RX ORDER — RISPERIDONE 0.5 MG/1
0.5 TABLET ORAL DAILY
Qty: 30 TABLET | Refills: 0 | Status: SHIPPED | OUTPATIENT
Start: 2022-05-19 | End: 2023-05-19

## 2022-05-18 RX ORDER — RISPERIDONE 1 MG/1
1 TABLET ORAL NIGHTLY
Qty: 30 TABLET | Refills: 0 | Status: SHIPPED | OUTPATIENT
Start: 2022-05-18 | End: 2023-05-18

## 2022-05-18 RX ADMIN — RISPERIDONE 0.5 MG: 0.25 TABLET ORAL at 09:05

## 2022-05-18 RX ADMIN — SPIRONOLACTONE 50 MG: 25 TABLET ORAL at 09:05

## 2022-05-18 RX ADMIN — PANTOPRAZOLE SODIUM 40 MG: 40 TABLET, DELAYED RELEASE ORAL at 09:05

## 2022-05-18 NOTE — PLAN OF CARE
Problem: Adult Inpatient Plan of Care  Goal: Plan of Care Review  Outcome: Met  Goal: Patient-Specific Goal (Individualized)  Outcome: Met  Goal: Readiness for Transition of Care  Outcome: Met     Problem: Behavior Regulation Impairment (Psychotic Signs/Symptoms)  Goal: Improved Behavioral Control (Psychotic Signs/Symptoms)  Outcome: Met     Problem: Cognitive Impairment (Psychotic Signs/Symptoms)  Goal: Optimal Cognitive Function (Psychotic Signs/Symptoms)  Outcome: Met     Problem: Mood Impairment (Psychotic Signs/Symptoms)  Goal: Improved Mood Symptoms (Psychotic Signs/Symptoms)  Outcome: Met     Problem: Sleep Disturbance (Psychotic Signs/Symptoms)  Goal: Improved Sleep (Psychotic Signs/Symptoms)  Outcome: Met

## 2022-05-18 NOTE — PROGRESS NOTES
5/18/2022 11:06 AM   Gris Cabrales   2000   [id number]        Psychiatry Progress Note     SUBJECTIVE:   Gris Cabrales is a 21 y.o. female placed under a PEC at [facility] due to anxiety, depression, and her mother's concerns about her confusion.  Due for discharge home today.  Has an appointment with [facility] for follow-up.  Significant improvement.  These issues may have been exacerbated by Accutane.  Will proceed with discharge.       Current Medications:   Scheduled Meds:    norethindrone-e.estradioL-iron  1 tablet Oral Daily    pantoprazole  40 mg Oral Daily    risperiDONE  0.5 mg Oral Daily    risperiDONE  1 mg Oral QHS    spironolactone  50 mg Oral BID      PRN Meds: acetaminophen, aluminum & magnesium hydroxide-simethicone, diphenhydrAMINE, diphenhydrAMINE, haloperidol lactate, hydrOXYzine HCL, lorazepam, magnesium hydroxide 400 mg/5 ml, trazodone   Psychotherapeutics (From admission, onward)            Start     Stop Route Frequency Ordered    05/14/22 0900  risperiDONE tablet 0.5 mg         -- Oral Daily 05/13/22 1220    05/13/22 2100  risperiDONE tablet 1 mg         -- Oral Nightly 05/13/22 1220    05/08/22 1937  haloperidol lactate injection 10 mg         -- IM Every 4 hours PRN 05/08/22 1842 05/08/22 1937  lorazepam injection 2 mg         -- IM Every 4 hours PRN 05/08/22 1842 05/08/22 1937  traZODone tablet 100 mg         -- Oral Nightly PRN 05/08/22 1842          Allergies:   Review of patient's allergies indicates:   Allergen Reactions    Penicillins Hives        OBJECTIVE:   Vitals   Vitals:    05/17/22 0857   BP: 111/74   Pulse:    Resp:    Temp:         Labs/Imaging/Studies:   No results found for this or any previous visit (from the past 36 hour(s)).     Psychiatric Mental Status Exam:  General Appearance: appears stated age, well-developed, well-nourished  Arousal: alert  Behavior: cooperative  Movements and Motor Activity: no abnormal involuntary movements  "noted  Orientation: oriented to person, place, time, and situation  Speech: normal rate, normal rhythm, normal volume, normal tone  Mood: "Good"  Affect: mood-congruent  Thought Process: linear, logical  Associations: intact  Thought Content and Perceptions: no suicidal ideation, no homicidal ideation, no auditory hallucinations, no visual hallucinations, no paranoid ideation, no ideas of reference, no evidence of delusions or psychosis  Recent and Remote Memory: recent memory intact, remote memory intact  Attention and Concentration: intact, attentive to conversation  Fund of Knowledge: intact, aware of current events, vocabulary appropriate  Insight: questionable  Judgment: questionable      ASSESSMENT/PLAN:   Diagnosis:  Bipolar Disorder, most recent episode manic, with psychotic features (F31.2)  R/o Schizoaffective Disorder, Schizophrenia    Past Medical History:   Diagnosis Date    Anxiety disorder, unspecified     Depression         Plan:  -Ok to discharge home    Expected Disposition Plan: Home      Cristhian White M.D.  "

## 2022-05-18 NOTE — DISCHARGE SUMMARY
"DISCHARGE SUMMARY  PSYCHIATRY      Admit Date: 5/8/2022  6:42 PM    Discharge Date:  5/18/2022    SITE:   [facility]    Discharge Attending Physician: Cristhian White MD    History of Present Illness On Admit:   Gris Cabrales is a 21 y.o. female placed under a PEC at [facility] due to anxiety, depression, and her mother's concerns about her confusion.  States, "I hate how people Jehov[employer]'s witness you after but they don't realize the blessings they have.  God created music."  She states that she's "Worked so hard for everything that she has."  She states that she has not smoked cigarettes since 2017 but has a nicotine patch on.  When asked why they put it on her she states, "To make me laugh".  Difficult to get linear answers from her.  Very tangential.  Grandiose.  Odd.  (+)Auditory and visual hallucinations.    Diagnoses:  PRINCIPAL PROBLEM:   Bipolar Disorder, most recent episode manic, with psychotic features (F31.2)  R/o Schizoaffective Disorder, Schizophrenia    PROBLEM LIST    Severe manic bipolar I disorder with psychotic features      Discharged Condition: Stable    Hospital Course:   Patient was admitted to [facility] and was started on Abilify 5mg daily.  Significant hyper-Pentecostalism, delusional, and psychotic symptoms noted.  Abilify was increased to a total of 15mg daily but the decision was made to change this medication as her disorganization and bizarre behavior did not remit.  Risperdal 0.5mg daily and 1mg HS was started and Abilify was discontinued.  She continued to have some auditory hallucinations on Risperdal but these did improve.  By the time of discharge, significant improvement noted.  It is possible that these symptoms may have been exacerbated by Accutane.  At the time of discharge no tremors, rigidity, extrapyramidal symptoms, or excessive sedation were noted.      Disposition: discharged to home      DISCHARGE EXAMINATION    VITALS   Vitals:    05/16/22 " "0800 05/16/22 0928 05/17/22 0700 05/17/22 0857   BP: 105/69 105/69 111/74 111/74   Pulse: 98  76    Resp: 18  18    Temp: 97.9 °F (36.6 °C)  98.2 °F (36.8 °C)    TempSrc:   Oral    SpO2: 98%  100%    Weight:       Height:             Psychiatric Mental Status Exam:  General Appearance: appears stated age, well-developed, well-nourished  Arousal: alert  Behavior: cooperative  Movements and Motor Activity: no abnormal involuntary movements noted  Orientation: oriented to person, place, time, and situation  Speech: normal rate, normal rhythm, normal volume, normal tone  Mood: "Good"  Affect: mood-congruent  Thought Process: linear, logical  Associations: intact  Thought Content and Perceptions: no suicidal ideation, no homicidal ideation, no auditory hallucinations, no visual hallucinations, no paranoid ideation, no ideas of reference, no evidence of delusions or psychosis  Recent and Remote Memory: recent memory intact, remote memory intact  Attention and Concentration: intact, attentive to conversation  Fund of Knowledge: intact, aware of current events, vocabulary appropriate  Insight: questionable  Judgment: questionable      Medication Regimen:    Current Facility-Administered Medications:     acetaminophen tablet 650 mg, 650 mg, Oral, Q6H PRN, Cristhian White MD, 650 mg at 05/08/22 2201    aluminum & magnesium hydroxide-simethicone 400-400-40 mg/5 mL suspension 30 mL, 30 mL, Oral, Q6H PRN, Cristhian White MD, 30 mL at 05/14/22 2333    diphenhydrAMINE capsule 50 mg, 50 mg, Oral, Q6H PRN, Cristhian White MD    diphenhydrAMINE injection 50 mg, 50 mg, Intravenous, Q4H PRN, Cristhian White MD    haloperidol lactate injection 10 mg, 10 mg, Intramuscular, Q4H PRN, Cristhian White MD    hydrOXYzine tablet 50 mg, 50 mg, Oral, Q4H PRN, Cristhian White MD, 50 mg at 05/15/22 2004    lorazepam injection 2 mg, 2 mg, Intramuscular, Q4H PRN, Cristhian White MD    magnesium " hydroxide 400 mg/5 ml suspension 2,400 mg, 30 mL, Oral, Daily PRN, Cristhian White MD    norethindrone-e.estradioL-iron 1 mg-10 mcg (24)/10 mcg (2) Tab 1 tablet, 1 tablet, Oral, Daily, Hammad Blackwood MD    pantoprazole EC tablet 40 mg, 40 mg, Oral, Daily, Hammad Blackwood MD, 40 mg at 05/18/22 0921    risperiDONE tablet 0.5 mg, 0.5 mg, Oral, Daily, WILLIAM SampsonP, 0.5 mg at 05/18/22 0920    risperiDONE tablet 1 mg, 1 mg, Oral, QHS, KEVIN Sampson, 1 mg at 05/17/22 2026    spironolactone tablet 50 mg, 50 mg, Oral, BID, Hammad Blackwood MD, 50 mg at 05/18/22 0921    traZODone tablet 100 mg, 100 mg, Oral, Nightly PRN, Cristhian White MD, 100 mg at 05/17/22 2025      Patient Instructions:   Continue medication regimen as prescribed.    Disposition plan per  - see  notes for details.    Patient instructed to call 911 or present to emergency department if any of the following complications develop status post discharge: suicidality, homicidality, or grave disability.     Total time spent discharging patient: <30 minutes      Cristhian White M.D.